# Patient Record
Sex: MALE | Race: WHITE | NOT HISPANIC OR LATINO | Employment: PART TIME | URBAN - METROPOLITAN AREA
[De-identification: names, ages, dates, MRNs, and addresses within clinical notes are randomized per-mention and may not be internally consistent; named-entity substitution may affect disease eponyms.]

---

## 2022-12-23 ENCOUNTER — TRANSFERRED RECORDS (OUTPATIENT)
Dept: HEALTH INFORMATION MANAGEMENT | Facility: CLINIC | Age: 67
End: 2022-12-23

## 2022-12-27 ENCOUNTER — OFFICE VISIT (OUTPATIENT)
Dept: OPHTHALMOLOGY | Facility: CLINIC | Age: 67
End: 2022-12-27
Attending: OPHTHALMOLOGY
Payer: COMMERCIAL

## 2022-12-27 DIAGNOSIS — H04.123 DRY EYES, BILATERAL: ICD-10-CM

## 2022-12-27 DIAGNOSIS — H47.10 OPTIC DISC EDEMA: ICD-10-CM

## 2022-12-27 DIAGNOSIS — H46.9 OPTIC NEUROPATHY: Primary | ICD-10-CM

## 2022-12-27 PROCEDURE — 99207 OCT RETINA SPECTRALIS OU (BOTH EYE): CPT | Mod: 26 | Performed by: OPHTHALMOLOGY

## 2022-12-27 PROCEDURE — 99204 OFFICE O/P NEW MOD 45 MIN: CPT | Mod: GC | Performed by: OPHTHALMOLOGY

## 2022-12-27 PROCEDURE — 92083 EXTENDED VISUAL FIELD XM: CPT | Performed by: STUDENT IN AN ORGANIZED HEALTH CARE EDUCATION/TRAINING PROGRAM

## 2022-12-27 PROCEDURE — G0463 HOSPITAL OUTPT CLINIC VISIT: HCPCS

## 2022-12-27 PROCEDURE — 92083 EXTENDED VISUAL FIELD XM: CPT | Mod: 26 | Performed by: OPHTHALMOLOGY

## 2022-12-27 PROCEDURE — 92250 FUNDUS PHOTOGRAPHY W/I&R: CPT | Mod: 59 | Performed by: STUDENT IN AN ORGANIZED HEALTH CARE EDUCATION/TRAINING PROGRAM

## 2022-12-27 PROCEDURE — 92133 CPTRZD OPH DX IMG PST SGM ON: CPT | Mod: 26 | Performed by: OPHTHALMOLOGY

## 2022-12-27 PROCEDURE — 92133 CPTRZD OPH DX IMG PST SGM ON: CPT | Performed by: STUDENT IN AN ORGANIZED HEALTH CARE EDUCATION/TRAINING PROGRAM

## 2022-12-27 PROCEDURE — 99207 FUNDUS PHOTOS OU (BOTH EYES): CPT | Mod: 26 | Performed by: OPHTHALMOLOGY

## 2022-12-27 PROCEDURE — 92134 CPTRZ OPH DX IMG PST SGM RTA: CPT | Performed by: STUDENT IN AN ORGANIZED HEALTH CARE EDUCATION/TRAINING PROGRAM

## 2022-12-27 RX ORDER — LOVASTATIN 40 MG
40 TABLET ORAL DAILY
COMMUNITY
Start: 2022-11-11

## 2022-12-27 RX ORDER — LOSARTAN POTASSIUM 25 MG/1
0.5 TABLET ORAL DAILY
COMMUNITY
Start: 2022-11-11

## 2022-12-27 RX ORDER — METFORMIN HCL 500 MG
500 TABLET, EXTENDED RELEASE 24 HR ORAL
COMMUNITY
Start: 2022-11-11

## 2022-12-27 RX ORDER — CARBOXYMETHYLCELLULOSE SODIUM 5 MG/ML
1 SOLUTION/ DROPS OPHTHALMIC 4 TIMES DAILY PRN
Status: ACTIVE | OUTPATIENT
Start: 2022-12-27

## 2022-12-27 RX ORDER — TRAZODONE HYDROCHLORIDE 50 MG/1
1 TABLET, FILM COATED ORAL AT BEDTIME
COMMUNITY
Start: 2022-08-18

## 2022-12-27 RX ORDER — TEMAZEPAM 15 MG/1
15 CAPSULE ORAL
COMMUNITY
Start: 2022-11-11

## 2022-12-27 RX ORDER — MOXIFLOXACIN 5 MG/ML
1 SOLUTION/ DROPS OPHTHALMIC 4 TIMES DAILY
Qty: 3 ML | Refills: 0 | Status: SHIPPED | OUTPATIENT
Start: 2022-12-27

## 2022-12-27 ASSESSMENT — SLIT LAMP EXAM - LIDS
COMMENTS: MGD
COMMENTS: MGD

## 2022-12-27 ASSESSMENT — CONF VISUAL FIELD
OS_INFERIOR_NASAL_RESTRICTION: 0
OD_SUPERIOR_TEMPORAL_RESTRICTION: 0
METHOD: COUNTING FINGERS
OD_INFERIOR_NASAL_RESTRICTION: 0
OD_SUPERIOR_NASAL_RESTRICTION: 0
OS_SUPERIOR_NASAL_RESTRICTION: 0
OS_INFERIOR_TEMPORAL_RESTRICTION: 0
OS_NORMAL: 1
OD_NORMAL: 1
OD_INFERIOR_TEMPORAL_RESTRICTION: 0
OS_SUPERIOR_TEMPORAL_RESTRICTION: 0

## 2022-12-27 ASSESSMENT — VISUAL ACUITY
CORRECTION_TYPE: GLASSES
OS_CC+: -1
METHOD: SNELLEN - LINEAR
OD_CC+: -1
OD_CC: 20/20
OS_CC: 20/20

## 2022-12-27 ASSESSMENT — TONOMETRY
OD_IOP_MMHG: 18
IOP_METHOD: TONOPEN
OS_IOP_MMHG: 15

## 2022-12-27 ASSESSMENT — REFRACTION_WEARINGRX
OD_SPHERE: +3.00
OS_AXIS: 034
SPECS_TYPE: PAL
OS_SPHERE: +3.00
OS_CYLINDER: +0.50
OD_ADD: +2.50
OD_CYLINDER: +0.75
OD_AXIS: 161
OS_ADD: +2.50

## 2022-12-27 ASSESSMENT — CUP TO DISC RATIO: OS_RATIO: .2

## 2022-12-27 NOTE — PROGRESS NOTES
Ophthalmology Acute Clinic     Chief Complaint(s) and History of Present Illness(es)    FB feeling right eye off and on X1week  C/o watering, burning and irritation   States floaters both eye right >Left  No redness    Dora Jang COT 7:43 AM December 27, 2022                HPI:   David Wu is a 67 year old male who presents for further evaluation from optometrist. He reports that he had a foreign body sensation in his right eye for about a week. He has noted that on his right inferior peripheral vision everything is blurry. On Friday he saw his optometrist at [facility] in [place]. There he was found to have optic disc edema.      ROS    ENT: Normal  Cardiac: Normal  Derm: Normal  Respiratory: Normal  Muscle/Skel: Normal  Allergic/Immunology: Normal  Neuro: Normal  Psych: Normal  Endocrine: Normal  Heme: Normal  Rheumatologic: Normal  : Normal  GI: Normal  Constitutional: Normal           Past Ocular history:   - Glasses: Yes  - Contact lens wear: None  - Ocular Surgical History: No surgeries but has had metal exposure to cornea previously  - Current Eye drops: None    PMH: HTN, HLD, DM, osteoarthritis    FH: No glaucoma or AMD.     Review of systems for the eyes was negative other than the pertinent positives/negatives listed in the HPI.      Assessment & Plan      David Wu is a 67 year old male with the following diagnoses:   1. Optic neuropathy - Right Eye    2. Optic disc edema - Right Eye    3. Dry eyes, bilateral         NAION right eye   Optic disc edema right eye   Inferior peripheral vision loss right eye     History of HTN, HLD, DM, previous smoker, currently using sildenafil occasionally.  Patient denies headache, scalp tenderness, jaw claudication, fever, fatigue, unintended weight loss, double vision, vision loss, muscle pain in shoulders.  Exam showed no temporal tenderness. Temporal artery is pulsatile without gross palpable thickening.  Patient has grade 3 disc edema with flame  hemorrhage right eye without any involvement of left eye. His visual fields show a right inferior altitudinal defect with normal left vision.  -Discontinue sildenafil or other ED medications, counseled that in the future these would increase risk of recurrence   -Follow up with PCP to optimize HTN, HLD, DM  -Does not smoke, counseled against smoking in the future  -Follow up in 4-6 weeks, return sooner with worsening symptoms    Central punctate abrasion right eye  Dry eye each eye   EBMD  No surrounding stromal haze. Likely   - Vigamox four times a day right eye  - Recommended artificial tears QID both eyes  - Recommend warm compress twice daily  - Follow up in 1 week here or with optometrist  - Return precautions reviewed     Patient disposition:   Return in about 6 weeks (around 2/7/2023) for Follow up VTD.    Patient seen with Dr. Jang    Thank you for entrusting us with your care  Fidelina Tellez MD  Resident Physician, PGY-2  Department of Ophthalmology  12/27/22 8:04 AM    Attending Physician Attestation:  Complete documentation of historical and exam elements from today's encounter can be found in the full encounter summary report (not reduplicated in this progress note).  I personally obtained the chief complaint(s) and history of present illness.  I confirmed and edited as necessary the review of systems, past medical/surgical history, family history, social history, and examination findings as documented by others; and I examined the patient myself.  I personally reviewed the relevant tests, images, and reports as documented above.  I formulated and edited as necessary the assessment and plan and discussed the findings and management plan with the patient and family. Attending Physician Image/Tesing Attestation: I personally reviewed the ophthalmic test(s) associated with this encounter, agree with the interpretation(s) as documented by the resident/fellow, and have edited the corresponding report(s) as  necessary.   . - Silvestre Jang MD

## 2022-12-27 NOTE — PATIENT INSTRUCTIONS
Recommended regimen:  Artificial tears 4x/day - better to use preservative free if using more than 4x/day  Warm compress twice a day at least - either a warm towel or a clean sock filled with rice and then microwaved until it's warm      Artificial Tears 4-5x/day:  Systane  Refresh  Tears Naturale  Genteal  Optive  Soothe  Hypotears    All of these are good products.    DO NOT USE VISINE OR CLEAR EYES.    Warm compresses  Perform 2 times daily, or as ordered by your doctor   1.. Use a commercially available hot pack   2. Dry rice in a clean sock and microwave until hot       - Place warm compress on closed eyelids for about 5 minutes       - Gently massage eye lids for 30 seconds

## 2022-12-27 NOTE — LETTER
12/27/2022        RE: David Wu  [address] [address]        Ophthalmology Acute Clinic     Chief Complaint(s) and History of Present Illness(es)    FB feeling right eye off and on X1week  C/o watering, burning and irritation   States floaters both eye right >Left  No redness    Dora Jang COT 7:43 AM December 27, 2022                HPI:   David Wu is a 67 year old male who presents for further evaluation from optometrist. He reports that he had a foreign body sensation in his right eye for about a week. He has noted that on his right inferior peripheral vision everything is blurry. On Friday he saw his optometrist at [facility] in [place]. There he was found to have optic disc edema.      ROS    ENT: Normal  Cardiac: Normal  Derm: Normal  Respiratory: Normal  Muscle/Skel: Normal  Allergic/Immunology: Normal  Neuro: Normal  Psych: Normal  Endocrine: Normal  Heme: Normal  Rheumatologic: Normal  : Normal  GI: Normal  Constitutional: Normal           Past Ocular history:   - Glasses: Yes  - Contact lens wear: None  - Ocular Surgical History: No surgeries but has had metal exposure to cornea previously  - Current Eye drops: None    PMH: HTN, HLD, DM, osteoarthritis    FH: No glaucoma or AMD.     Review of systems for the eyes was negative other than the pertinent positives/negatives listed in the HPI.      Assessment & Plan     David Wu is a 67 year old male with the following diagnoses:   1. Optic neuropathy - Right Eye    2. Optic disc edema - Right Eye    3. Dry eyes, bilateral         NAION right eye   Optic disc edema right eye   Inferior peripheral vision loss right eye     History of HTN, HLD, DM, previous smoker, currently using sildenafil occasionally.  Patient denies headache, scalp tenderness, jaw claudication, fever, fatigue, unintended weight loss, double vision, vision loss, muscle pain in shoulders.  Exam showed no temporal tenderness. Temporal artery is  pulsatile without gross palpable thickening.  Patient has grade 3 disc edema with flame hemorrhage right eye without any involvement of left eye. His visual fields show a right inferior altitudinal defect with normal left vision.  -Discontinue sildenafil or other ED medications, counseled that in the future these would increase risk of recurrence   -Follow up with PCP to optimize HTN, HLD, DM  -Does not smoke, counseled against smoking in the future  -Follow up in 4-6 weeks, return sooner with worsening symptoms    Central punctate abrasion right eye  Dry eye each eye   EBMD  No surrounding stromal haze. Likely   - Vigamox four times a day right eye  - Recommended artificial tears QID both eyes  - Recommend warm compress twice daily  - Follow up in 1 week here or with optometrist  - Return precautions reviewed     Patient disposition:   Return in about 6 weeks (around 2/7/2023) for Follow up VTD.    Patient seen with Dr. Jang    Thank you for entrusting us with your care  Fidelina Tellez MD  Resident Physician, PGY-2  Department of Ophthalmology  12/27/22 8:04 AM    Attending Physician Attestation:  Complete documentation of historical and exam elements from today's encounter can be found in the full encounter summary report (not reduplicated in this progress note).  I personally obtained the chief complaint(s) and history of present illness.  I confirmed and edited as necessary the review of systems, past medical/surgical history, family history, social history, and examination findings as documented by others; and I examined the patient myself.  I personally reviewed the relevant tests, images, and reports as documented above.  I formulated and edited as necessary the assessment and plan and discussed the findings and management plan with the patient and family. Attending Physician Image/Tesing Attestation: I personally reviewed the ophthalmic test(s) associated with this encounter, agree with the interpretation(s)  as documented by the resident/fellow, and have edited the corresponding report(s) as necessary.   . - Silvestre Jang MD           Sincerely,        Nanette Tellez MD

## 2022-12-27 NOTE — NURSING NOTE
No chief complaint on file.    Chief Complaint(s) and History of Present Illness(es)    FB feeling right eye off and on X1week  C/o watering, burning and irritation   States floaters both eye right >Left  No redness    Dora Jang COT 7:43 AM December 27, 2022

## 2023-10-10 ENCOUNTER — APPOINTMENT (OUTPATIENT)
Dept: MRI IMAGING | Facility: CLINIC | Age: 68
End: 2023-10-10
Attending: EMERGENCY MEDICINE
Payer: COMMERCIAL

## 2023-10-10 ENCOUNTER — HOSPITAL ENCOUNTER (EMERGENCY)
Facility: CLINIC | Age: 68
Discharge: HOME | End: 2023-10-10
Attending: EMERGENCY MEDICINE | Admitting: EMERGENCY MEDICINE
Payer: COMMERCIAL

## 2023-10-10 VITALS
HEIGHT: 72 IN | HEART RATE: 58 BPM | SYSTOLIC BLOOD PRESSURE: 151 MMHG | WEIGHT: 205 LBS | RESPIRATION RATE: 18 BRPM | OXYGEN SATURATION: 94 % | TEMPERATURE: 96.5 F | DIASTOLIC BLOOD PRESSURE: 84 MMHG | BODY MASS INDEX: 27.77 KG/M2

## 2023-10-10 DIAGNOSIS — J01.30 ACUTE NON-RECURRENT SPHENOIDAL SINUSITIS: ICD-10-CM

## 2023-10-10 DIAGNOSIS — R42 VERTIGO: ICD-10-CM

## 2023-10-10 LAB
ALBUMIN SERPL BCG-MCNC: 4.8 G/DL (ref 3.5–5.2)
ALP SERPL-CCNC: 52 U/L (ref 40–129)
ALT SERPL W P-5'-P-CCNC: 20 U/L (ref 0–70)
ANION GAP SERPL CALCULATED.3IONS-SCNC: 15 MMOL/L (ref 7–15)
AST SERPL W P-5'-P-CCNC: 29 U/L (ref 0–45)
BASO+EOS+MONOS # BLD AUTO: NORMAL 10*3/UL
BASO+EOS+MONOS NFR BLD AUTO: NORMAL %
BASOPHILS # BLD AUTO: 0 10E3/UL (ref 0–0.2)
BASOPHILS NFR BLD AUTO: 0 %
BILIRUB SERPL-MCNC: 1.8 MG/DL
BUN SERPL-MCNC: 9.3 MG/DL (ref 8–23)
CALCIUM SERPL-MCNC: 9.8 MG/DL (ref 8.8–10.2)
CHLORIDE SERPL-SCNC: 92 MMOL/L (ref 98–107)
CREAT SERPL-MCNC: 0.81 MG/DL (ref 0.67–1.17)
DEPRECATED HCO3 PLAS-SCNC: 27 MMOL/L (ref 22–29)
EGFRCR SERPLBLD CKD-EPI 2021: >90 ML/MIN/1.73M2
EOSINOPHIL # BLD AUTO: 0 10E3/UL (ref 0–0.7)
EOSINOPHIL NFR BLD AUTO: 0 %
ERYTHROCYTE [DISTWIDTH] IN BLOOD BY AUTOMATED COUNT: 13.9 % (ref 10–15)
GLUCOSE SERPL-MCNC: 147 MG/DL (ref 70–99)
HCT VFR BLD AUTO: 45.6 % (ref 40–53)
HGB BLD-MCNC: 15.6 G/DL (ref 13.3–17.7)
HOLD SPECIMEN: NORMAL
HOLD SPECIMEN: NORMAL
IMM GRANULOCYTES # BLD: 0 10E3/UL
IMM GRANULOCYTES NFR BLD: 0 %
LYMPHOCYTES # BLD AUTO: 1.5 10E3/UL (ref 0.8–5.3)
LYMPHOCYTES NFR BLD AUTO: 16 %
MCH RBC QN AUTO: 29.5 PG (ref 26.5–33)
MCHC RBC AUTO-ENTMCNC: 34.2 G/DL (ref 31.5–36.5)
MCV RBC AUTO: 86 FL (ref 78–100)
MONOCYTES # BLD AUTO: 0.7 10E3/UL (ref 0–1.3)
MONOCYTES NFR BLD AUTO: 8 %
NEUTROPHILS # BLD AUTO: 7.3 10E3/UL (ref 1.6–8.3)
NEUTROPHILS NFR BLD AUTO: 76 %
NRBC # BLD AUTO: 0 10E3/UL
NRBC BLD AUTO-RTO: 0 /100
PLATELET # BLD AUTO: 329 10E3/UL (ref 150–450)
POTASSIUM SERPL-SCNC: 3.4 MMOL/L (ref 3.4–5.3)
PROT SERPL-MCNC: 7.4 G/DL (ref 6.4–8.3)
RBC # BLD AUTO: 5.29 10E6/UL (ref 4.4–5.9)
SODIUM SERPL-SCNC: 134 MMOL/L (ref 135–145)
WBC # BLD AUTO: 9.6 10E3/UL (ref 4–11)

## 2023-10-10 PROCEDURE — 85025 COMPLETE CBC W/AUTO DIFF WBC: CPT | Performed by: EMERGENCY MEDICINE

## 2023-10-10 PROCEDURE — 36415 COLL VENOUS BLD VENIPUNCTURE: CPT | Performed by: EMERGENCY MEDICINE

## 2023-10-10 PROCEDURE — 93005 ELECTROCARDIOGRAM TRACING: CPT | Performed by: EMERGENCY MEDICINE

## 2023-10-10 PROCEDURE — 70549 MR ANGIOGRAPH NECK W/O&W/DYE: CPT

## 2023-10-10 PROCEDURE — 258N000003 HC RX IP 258 OP 636: Performed by: EMERGENCY MEDICINE

## 2023-10-10 PROCEDURE — 96374 THER/PROPH/DIAG INJ IV PUSH: CPT | Mod: 59 | Performed by: EMERGENCY MEDICINE

## 2023-10-10 PROCEDURE — 99284 EMERGENCY DEPT VISIT MOD MDM: CPT | Mod: 25 | Performed by: EMERGENCY MEDICINE

## 2023-10-10 PROCEDURE — A9585 GADOBUTROL INJECTION: HCPCS | Performed by: EMERGENCY MEDICINE

## 2023-10-10 PROCEDURE — 96361 HYDRATE IV INFUSION ADD-ON: CPT | Performed by: EMERGENCY MEDICINE

## 2023-10-10 PROCEDURE — 255N000002 HC RX 255 OP 636: Performed by: EMERGENCY MEDICINE

## 2023-10-10 PROCEDURE — 70544 MR ANGIOGRAPHY HEAD W/O DYE: CPT | Mod: XU

## 2023-10-10 PROCEDURE — 250N000011 HC RX IP 250 OP 636: Performed by: EMERGENCY MEDICINE

## 2023-10-10 PROCEDURE — 93010 ELECTROCARDIOGRAM REPORT: CPT | Performed by: EMERGENCY MEDICINE

## 2023-10-10 PROCEDURE — 70553 MRI BRAIN STEM W/O & W/DYE: CPT

## 2023-10-10 PROCEDURE — 80053 COMPREHEN METABOLIC PANEL: CPT | Performed by: EMERGENCY MEDICINE

## 2023-10-10 PROCEDURE — 99285 EMERGENCY DEPT VISIT HI MDM: CPT | Mod: 25 | Performed by: EMERGENCY MEDICINE

## 2023-10-10 RX ORDER — GADOBUTROL 604.72 MG/ML
10 INJECTION INTRAVENOUS ONCE
Status: COMPLETED | OUTPATIENT
Start: 2023-10-10 | End: 2023-10-10

## 2023-10-10 RX ORDER — LORAZEPAM 2 MG/ML
1 INJECTION INTRAMUSCULAR ONCE
Status: COMPLETED | OUTPATIENT
Start: 2023-10-10 | End: 2023-10-10

## 2023-10-10 RX ORDER — MECLIZINE HCL 12.5 MG 12.5 MG/1
12.5 TABLET ORAL 4 TIMES DAILY PRN
Qty: 30 TABLET | Refills: 0 | Status: SHIPPED | OUTPATIENT
Start: 2023-10-10

## 2023-10-10 RX ADMIN — SODIUM CHLORIDE 50 ML: 9 INJECTION, SOLUTION INTRAVENOUS at 11:23

## 2023-10-10 RX ADMIN — GADOBUTROL 10 ML: 604.72 INJECTION INTRAVENOUS at 11:36

## 2023-10-10 RX ADMIN — SODIUM CHLORIDE 1000 ML: 9 INJECTION, SOLUTION INTRAVENOUS at 10:09

## 2023-10-10 RX ADMIN — LORAZEPAM 1 MG: 2 INJECTION INTRAMUSCULAR; INTRAVENOUS at 10:09

## 2023-10-10 ASSESSMENT — ACTIVITIES OF DAILY LIVING (ADL)
ADLS_ACUITY_SCORE: 35
ADLS_ACUITY_SCORE: 35

## 2023-10-10 NOTE — DISCHARGE INSTRUCTIONS
Follow-up with your primary clinic as needed.    Augmentin as directed.    Meclizine, as directed, if needed for vertigo.    Use Afrin nasal spray, 2 sprays in each nostril twice per day for no longer than 3 days in a row to help with sinus and ear congestion.    Return to the emergency department for any problems.

## 2023-10-10 NOTE — ED TRIAGE NOTES
Pt c/o persistant vertigo x 3 days, states has had this before can't get rid of it this time. Pt has cont nausea with dry heaves. Denies weakness, numbness or tingling.      Triage Assessment       Row Name 10/10/23 0944       Triage Assessment (Adult)    Airway WDL WDL       Respiratory WDL    Respiratory WDL WDL       Skin Circulation/Temperature WDL    Skin Circulation/Temperature WDL WDL       Cardiac WDL    Cardiac WDL WDL       Peripheral/Neurovascular WDL    Peripheral Neurovascular WDL WDL       Cognitive/Neuro/Behavioral WDL    Cognitive/Neuro/Behavioral WDL WDL

## 2023-10-10 NOTE — ED NOTES
Pt reports his right ear feeling plugged 2-5 days prior to vertigo episode. Pt said this is the ear that usually triggers his vertigo.

## 2023-10-10 NOTE — ED PROVIDER NOTES
History     Chief Complaint   Patient presents with    Vertigo     HPI  David Wu is a 68 year old male who presents to the emergency department reporting 3 days of vertigo with nausea and vomiting.  This has been constant and does not seem to be worsened by anything in particular.  Patient does report that he has had similar presentation in the past.  He reports that his right ear feels clogged and he is having some tinnitus.  He has been unable to walk today secondary to the severity of his symptoms.  No recent head injury.  He denies headache, change in vision, double vision, numbness or weakness of extremities or difficulty swallowing or speaking.    Allergies:  Allergies   Allergen Reactions    Lisinopril Cough       Problem List:    Patient Active Problem List    Diagnosis Date Noted    Hypertension goal BP (blood pressure) < 140/90 12/11/2014     Priority: Medium    Arthrosis of right acromioclavicular joint 10/21/2014     Priority: Medium    Biceps tendonitis 10/21/2014     Priority: Medium    Rotator cuff tendinitis 10/21/2014     Priority: Medium    CARDIOVASCULAR SCREENING; LDL GOAL LESS THAN 130 05/27/2014     Priority: Medium    MRSA infection 05/18/2014     Priority: Medium     5/2014.  Brock finger.      Impingement syndrome, shoulder 04/22/2014     Priority: Medium    Paresthesias/numbness 04/22/2014     Priority: Medium     IMO Regulatory Load OCT 2020      Foreign body of right ear 01/25/2012     Priority: Medium    Tinnitus 01/25/2012     Priority: Medium    Lateral epicondylitis 11/18/2010     Priority: Medium     Problem list name updated by automated process. Provider to review      Pain in joint, upper arm 11/18/2010     Priority: Medium    Synovitis and tenosynovitis 11/18/2010     Priority: Medium     Problem list name updated by automated process. Provider to review      Epicondylitis, lateral humeral 11/17/2010     Priority: Medium    Contusion shoulder/arm 07/20/2010     Priority:  Medium        Past Medical History:    Past Medical History:   Diagnosis Date    Hypertension        Past Surgical History:    Past Surgical History:   Procedure Laterality Date    ARTHROSCOPY SHOULDER, OPEN DISTAL CLAVICLE REPAIR, COMBINED Right 2014    Procedure: COMBINED ARTHROSCOPY SHOULDER, OPEN DISTAL CLAVICLE RESECTION;  Surgeon: Cristóbal Rose MD;  Location: [facility] OR    ARTHROSCOPY SHOULDER, OPEN ROTATOR CUFF REPAIR, COMBINED Right 2014    Procedure: COMBINED ARTHROSCOPY SHOULDER, OPEN ROTATOR CUFF REPAIR;  Surgeon: Cristóbal Rose MD;  Location: [facility] OR    NO HISTORY OF SURGERY         Family History:    Family History   Problem Relation Age of Onset    Arthritis Mother     Arthritis Father     Diabetes No family hx of     Glaucoma No family hx of     Macular Degeneration No family hx of        Social History:  Marital Status:   [2]  Social History     Tobacco Use    Smoking status: Former     Types: Cigarettes     Quit date: 1989     Years since quittin.5    Smokeless tobacco: Never   Substance Use Topics    Alcohol use: Yes     Comment: 8-10 beers weekly    Drug use: No        Medications:    amoxicillin-clavulanate (AUGMENTIN) 875-125 MG tablet  meclizine (ANTIVERT) 12.5 MG tablet  ASPIRIN 325 MG PO TABS  Atorvastatin Calcium (LIPITOR PO)  Cholecalciferol (VITAMIN D) 2000 UNIT CAPS  HYDROCHLOROTHIAZIDE 50 MG OR TABS  hydrOXYzine (VISTARIL) 25 MG capsule  losartan (COZAAR) 25 MG tablet  lovastatin (MEVACOR) 40 MG tablet  metFORMIN (GLUCOPHAGE XR) 500 MG 24 hr tablet  moxifloxacin (VIGAMOX) 0.5 % ophthalmic solution  Omega-3 Fatty Acids (FISH OIL) 1000 MG CPDR  oxyCODONE (ROXICODONE) 5 MG immediate release tablet  temazepam (RESTORIL) 15 MG capsule  traZODone (DESYREL) 50 MG tablet          Review of Systems    Physical Exam   BP: (!) 154/80  Pulse: 67  Temp: (!) 96.5  F (35.8  C)  Resp: 18  Height: 182.9 cm (6')  Weight: 93 kg (205 lb)  SpO2: 99 %      Physical  Exam  Vitals and nursing note reviewed.   Constitutional:       General: He is not in acute distress.     Appearance: He is well-developed. He is not ill-appearing, toxic-appearing or diaphoretic.   HENT:      Head: Normocephalic and atraumatic.      Mouth/Throat:      Lips: Pink.      Mouth: Mucous membranes are moist.      Pharynx: Oropharynx is clear. No oropharyngeal exudate.   Eyes:      General: Lids are normal. No visual field deficit or scleral icterus.     Extraocular Movements: Extraocular movements intact.      Right eye: Nystagmus present.      Left eye: Nystagmus present.      Conjunctiva/sclera: Conjunctivae normal.      Pupils: Pupils are equal, round, and reactive to light.   Neck:      Thyroid: No thyromegaly.      Vascular: No JVD.      Trachea: No tracheal deviation.   Cardiovascular:      Rate and Rhythm: Normal rate and regular rhythm.      Pulses: Normal pulses.      Heart sounds: Normal heart sounds. No murmur heard.     No friction rub. No gallop.   Pulmonary:      Effort: Pulmonary effort is normal. No respiratory distress.      Breath sounds: Normal breath sounds.   Abdominal:      General: Bowel sounds are normal. There is no distension.      Palpations: Abdomen is soft. There is no mass.      Tenderness: There is no abdominal tenderness. There is no guarding or rebound.   Musculoskeletal:         General: No tenderness. Normal range of motion.      Cervical back: Normal range of motion and neck supple. No erythema or rigidity.      Right lower leg: No edema.      Left lower leg: No edema.   Lymphadenopathy:      Cervical: No cervical adenopathy.   Skin:     General: Skin is warm and dry.      Capillary Refill: Capillary refill takes less than 2 seconds.      Coloration: Skin is not pale.      Findings: No erythema or rash.   Neurological:      Mental Status: He is alert and oriented to person, place, and time.      Cranial Nerves: No cranial nerve deficit, dysarthria or facial asymmetry.       Sensory: No sensory deficit.      Motor: Motor function is intact.      Coordination: Finger-Nose-Finger Test and Heel to Shin Test normal.      Gait: Gait abnormal.      Comments: No aphasia noted.  Patient does have some rotatory nystagmus.  He is unable to stand up or ambulate as he is off balance and begins falling when attempting to do so.   Psychiatric:         Mood and Affect: Mood and affect normal.         Speech: Speech normal.         Behavior: Behavior normal.         ED Course                 Procedures              EKG Interpretation:      Interpreted by Jimmy Waite MD    Symptoms at time of EKG: Vertigo  Rhythm: sinus bradycardia  Rate: 57  Ectopy: none  Conduction: normal  ST Segments/ T Waves: No acute ischemic changes  Q Waves: none  Comparison to prior: Unchanged    Clinical Impression: no acute changes and sinus bradycardia                   Results for orders placed or performed during the hospital encounter of 10/10/23 (from the past 24 hour(s))   [place] Draw    Narrative    The following orders were created for panel order [place] Draw.  Procedure                               Abnormality         Status                     ---------                               -----------         ------                     Extra Blue Top Tube[[id number]]                              Final result               Extra Red Top Tube[[id number]]                               Final result               Extra Green Top (Lithium...[[id number]]                                                 Extra Purple Top Tube[[id number]]                                                         Please view results for these tests on the individual orders.   Extra Blue Top Tube   Result Value Ref Range    Hold Specimen JIC    Extra Red Top Tube   Result Value Ref Range    Hold Specimen JIC    Comprehensive metabolic panel   Result Value Ref Range    Sodium 134 (L) 135 - 145 mmol/L    Potassium 3.4 3.4 - 5.3 mmol/L     Carbon Dioxide (CO2) 27 22 - 29 mmol/L    Anion Gap 15 7 - 15 mmol/L    Urea Nitrogen 9.3 8.0 - 23.0 mg/dL    Creatinine 0.81 0.67 - 1.17 mg/dL    GFR Estimate >90 >60 mL/min/1.73m2    Calcium 9.8 8.8 - 10.2 mg/dL    Chloride 92 (L) 98 - 107 mmol/L    Glucose 147 (H) 70 - 99 mg/dL    Alkaline Phosphatase 52 40 - 129 U/L    AST 29 0 - 45 U/L    ALT 20 0 - 70 U/L    Protein Total 7.4 6.4 - 8.3 g/dL    Albumin 4.8 3.5 - 5.2 g/dL    Bilirubin Total 1.8 (H) <=1.2 mg/dL   CBC with platelets differential    Narrative    The following orders were created for panel order CBC with platelets differential.  Procedure                               Abnormality         Status                     ---------                               -----------         ------                     CBC with platelets and d...[[id number]]                      Final result                 Please view results for these tests on the individual orders.   CBC with platelets and differential   Result Value Ref Range    WBC Count 9.6 4.0 - 11.0 10e3/uL    RBC Count 5.29 4.40 - 5.90 10e6/uL    Hemoglobin 15.6 13.3 - 17.7 g/dL    Hematocrit 45.6 40.0 - 53.0 %    MCV 86 78 - 100 fL    MCH 29.5 26.5 - 33.0 pg    MCHC 34.2 31.5 - 36.5 g/dL    RDW 13.9 10.0 - 15.0 %    Platelet Count 329 150 - 450 10e3/uL    % Neutrophils 76 %    % Lymphocytes 16 %    % Monocytes 8 %    Mids % (Monos, Eos, Basos)      % Eosinophils 0 %    % Basophils 0 %    % Immature Granulocytes 0 %    NRBCs per 100 WBC 0 <1 /100    Absolute Neutrophils 7.3 1.6 - 8.3 10e3/uL    Absolute Lymphocytes 1.5 0.8 - 5.3 10e3/uL    Absolute Monocytes 0.7 0.0 - 1.3 10e3/uL    Mids Abs (Monos, Eos, Basos)      Absolute Eosinophils 0.0 0.0 - 0.7 10e3/uL    Absolute Basophils 0.0 0.0 - 0.2 10e3/uL    Absolute Immature Granulocytes 0.0 <=0.4 10e3/uL    Absolute NRBCs 0.0 10e3/uL   MR Brain w/o & w Contrast    Narrative    MRI BRAIN WITHOUT AND WITH CONTRAST  10/10/2023 12:01 PM     HISTORY: 3 days of  constant vertigo.     TECHNIQUE: Multiplanar, multisequence MRI of the brain without and  with 10 mL GADAVIST.     COMPARISON: None.     FINDINGS: Questionable punctate focus of diffusion restriction in the  left parietal periventricular white matter (series 4, image 58). No  definite signal correlate on T2 or FLAIR imaging. A few additional  foci of minimally increased signal on diffusion restriction without  definite corresponding loss of signal on ADC are favored to reflect  noise versus T2 shine through. No territorial infarct. There is no  evidence of hemorrhage, mass, or herniation. Mild diffuse parenchymal  volume loss. Mild patchy deep and subcortical white matter T2  hyperintensities which are nonspecific, but likely related to chronic  microvascular ischemic disease. Ventricular size within normal limits  without hydrocephalus.     There is no abnormal intracranial enhancement.     Complete opacification of the left sphenoid sinus. The major arterial  T2 flow voids at the base of the brain appear patent.       Impression    IMPRESSION:  1. Questionable punctate focus of diffusion restriction in the left  parietal periventricular white matter, which is favored to be  artifactual, although a tiny acute infarct is possible.  2. Background age-related chronic microvascular ischemia.      SANAM JORDAN MD         SYSTEM ID:  [id number]   MRA Angiogram Head w/o Contrast    Narrative    MR ANGIOGRAM OF THE HEAD WITHOUT CONTRAST   10/10/2023 12:01 PM     HISTORY: 3 days of constant vertigo    TECHNIQUE:  3D time-of-flight MR angiogram of the head without  contrast.    COMPARISON: None.    FINDINGS: The major intracranial arteries including the proximal  branches of the anterior cerebral, middle cerebral, and posterior  cerebral arteries appear patent without vascular cutoff. No aneurysm  identified. No significant stenosis. Normal variant fetal origin of  the right posterior cerebral artery.       Impression     IMPRESSION:  Normal MRA of the [place] without large vessel  occlusion.        SANAM JORDAN MD         SYSTEM ID:  [id number]   MRA Angiogram Neck w/o & w Contrast    Narrative    MRA NECK WITHOUT AND WITH CONTRAST  10/10/2023 12:01 PM     HISTORY: 3 days of constant vertigo     TECHNIQUE: 2D time-of-flight MR angiogram of the neck without contrast  and 3D MR angiogram of the neck with  10 ML GADAVIST. Estimates of  carotid stenoses are made relative to the distal internal carotid  artery diameters except as noted.     COMPARISON: None.     FINDINGS:    Normal origin of the great vessels from the aortic arch.     Right carotid artery: The right common and internal carotid arteries  are patent. No significant stenosis.     Left carotid artery: The left common and internal carotid arteries are  patent. No significant stenosis.     Vertebral arteries: Vertebral arteries appear patent without evidence  of dissection. No significant stenosis.       Impression    IMPRESSION:  No stenosis/occlusion or dissection.       SANAM JORDAN MD         SYSTEM ID:  [id number]       Medications   sodium chloride 0.9% BOLUS 1,000 mL (0 mLs Intravenous Stopped 10/10/23 1346)   LORazepam (ATIVAN) injection 1 mg (1 mg Intravenous $Given 10/10/23 1009)   gadobutrol (GADAVIST) injection 10 mL (10 mLs Intravenous $Given 10/10/23 1136)   sodium chloride 0.9% BOLUS 50 mL (0 mLs Intravenous Stopped 10/10/23 1416)       Assessments & Plan (with Medical Decision Making)     I have reviewed the nursing notes.    I have reviewed the findings, diagnosis, plan and need for follow up with the patient.  This patient presented emergency department complaining of severe vertigo associated with nausea and vomiting and inability to ambulate because he felt off balance.  He does have rotatory nystagmus on exam.  Given his age and severity of symptoms I did obtain an MRI/MRA.  This demonstrated no evidence of posterior circulation stroke or  cervical vessel disease.  Opacification of the left sphenoid sinus is noted.  Patient felt much improved after dose of IV Ativan and IV fluid.  He was able to tolerate oral intake and was able to ambulate independently.  At this point time, he is comfortable going home.  I will treat for sinusitis with Augmentin and recommended Afrin nasal spray as well as meclizine if needed for symptoms.  He was told to return to the emergency department for worsening or return of symptoms as he may need hospitalization for symptom control and IV hydration.  He was discharged with his family in good condition.        Discharge Medication List as of 10/10/2023  2:17 PM        START taking these medications    Details   amoxicillin-clavulanate (AUGMENTIN) 875-125 MG tablet Take 1 tablet by mouth 2 times daily for 10 days, Disp-20 tablet, R-0, E-Prescribe      meclizine (ANTIVERT) 12.5 MG tablet Take 1 tablet (12.5 mg) by mouth 4 times daily as needed for dizziness, Disp-30 tablet, R-0, E-Prescribe             Final diagnoses:   Vertigo   Acute non-recurrent sphenoidal sinusitis       10/10/2023   [facility] EMERGENCY DEPT       Jimmy Waite MD  10/10/23 7578

## 2023-11-14 ENCOUNTER — HOSPITAL ENCOUNTER (EMERGENCY)
Facility: CLINIC | Age: 68
Discharge: HOME | End: 2023-11-14
Attending: FAMILY MEDICINE | Admitting: FAMILY MEDICINE
Payer: COMMERCIAL

## 2023-11-14 VITALS
BODY MASS INDEX: 27.09 KG/M2 | SYSTOLIC BLOOD PRESSURE: 126 MMHG | TEMPERATURE: 97.5 F | HEART RATE: 66 BPM | HEIGHT: 72 IN | OXYGEN SATURATION: 98 % | WEIGHT: 200 LBS | DIASTOLIC BLOOD PRESSURE: 82 MMHG

## 2023-11-14 DIAGNOSIS — H81.01 MENIERE'S DISEASE OF RIGHT EAR: ICD-10-CM

## 2023-11-14 PROCEDURE — 99284 EMERGENCY DEPT VISIT MOD MDM: CPT | Performed by: FAMILY MEDICINE

## 2023-11-14 PROCEDURE — 99283 EMERGENCY DEPT VISIT LOW MDM: CPT | Performed by: FAMILY MEDICINE

## 2023-11-14 RX ORDER — ONDANSETRON 4 MG/1
4-8 TABLET, ORALLY DISINTEGRATING ORAL EVERY 8 HOURS PRN
Qty: 12 TABLET | Refills: 0 | Status: SHIPPED | OUTPATIENT
Start: 2023-11-14

## 2023-11-14 RX ORDER — LORAZEPAM 1 MG/1
1 TABLET ORAL 2 TIMES DAILY PRN
Qty: 15 TABLET | Refills: 0 | Status: SHIPPED | OUTPATIENT
Start: 2023-11-14

## 2023-11-14 ASSESSMENT — ACTIVITIES OF DAILY LIVING (ADL)
ADLS_ACUITY_SCORE: 33
ADLS_ACUITY_SCORE: 35

## 2023-11-14 NOTE — ED TRIAGE NOTES
Pt c/o dizziness/vertigo for past 6 weeks but worse today.  No headache.  A&O x3.  Neurologically intact otherwise.  Here 6 weeks ago for same.       Triage Assessment (Adult)       Row Name 11/14/23 1507          Triage Assessment    Airway WDL WDL        Respiratory WDL    Respiratory WDL WDL        Skin Circulation/Temperature WDL    Skin Circulation/Temperature WDL WDL        Cardiac WDL    Cardiac WDL WDL        Peripheral/Neurovascular WDL    Peripheral Neurovascular WDL WDL        Cognitive/Neuro/Behavioral WDL    Cognitive/Neuro/Behavioral WDL WDL

## 2023-11-15 NOTE — DISCHARGE INSTRUCTIONS
RETURN TO THE EMERGENCY ROOM FOR THE FOLLOWING:    Severely worsened dizziness, repeated vomiting and dehydration, or at anytime for any concern.    FOLLOW UP:    ENT follow-up recommended, as discussed.    TREATMENT RECOMMENDATIONS:    Lorazepam as needed for dizziness, nausea, anxiety.  Zofran as needed for nausea.    NURSE ADVICE LINE:  [phone] or [phone]

## 2023-11-15 NOTE — ED PROVIDER NOTES
"  HPI   The patient is a 68-year-old male presenting with dizziness.  He has a known history of Ménière's disease.  This was diagnosed about 25 years ago.  He has not had follow-up with ENT for about 5 years.  He has not had any procedures performed on the ears.  He does not take medication on a regular basis for Ménière's disease.    The patient presents with recurrent/persistent dizziness, tinnitus, and hearing loss involving the right ear.  He had another episode start and it is moderate to severe.  Movement will worsen his symptoms but he does describe dizziness that to some extent is constant.  He will have associated nausea.  No new headache or neck pain.  No trauma or injury.  No vision changes, facial droop, one-sided weakness or incoordination.  He admits to being extremely frustrated and angry with his situation.  He said, \"I think I frightened my family because I sort of lost it.\"  He is not currently suicidal or homicidal.      Allergies:  Allergies   Allergen Reactions    Lisinopril Cough     Problem List:    Patient Active Problem List    Diagnosis Date Noted    Hypertension goal BP (blood pressure) < 140/90 12/11/2014     Priority: Medium    Arthrosis of right acromioclavicular joint 10/21/2014     Priority: Medium    Biceps tendonitis 10/21/2014     Priority: Medium    Rotator cuff tendinitis 10/21/2014     Priority: Medium    CARDIOVASCULAR SCREENING; LDL GOAL LESS THAN 130 05/27/2014     Priority: Medium    MRSA infection 05/18/2014     Priority: Medium     5/2014.  Brock finger.      Impingement syndrome, shoulder 04/22/2014     Priority: Medium    Paresthesias/numbness 04/22/2014     Priority: Medium     IMO Regulatory Load OCT 2020      Foreign body of right ear 01/25/2012     Priority: Medium    Tinnitus 01/25/2012     Priority: Medium    Lateral epicondylitis 11/18/2010     Priority: Medium     Problem list name updated by automated process. Provider to review      Pain in joint, upper arm " 2010     Priority: Medium    Synovitis and tenosynovitis 2010     Priority: Medium     Problem list name updated by automated process. Provider to review      Epicondylitis, lateral humeral 2010     Priority: Medium    Contusion shoulder/arm 2010     Priority: Medium      Past Medical History:    Past Medical History:   Diagnosis Date    Hypertension      Past Surgical History:    Past Surgical History:   Procedure Laterality Date    ARTHROSCOPY SHOULDER, OPEN DISTAL CLAVICLE REPAIR, COMBINED Right 2014    Procedure: COMBINED ARTHROSCOPY SHOULDER, OPEN DISTAL CLAVICLE RESECTION;  Surgeon: Cristóbal Rose MD;  Location: [facility] OR    ARTHROSCOPY SHOULDER, OPEN ROTATOR CUFF REPAIR, COMBINED Right 2014    Procedure: COMBINED ARTHROSCOPY SHOULDER, OPEN ROTATOR CUFF REPAIR;  Surgeon: Cristóbal Rose MD;  Location: [facility] OR    NO HISTORY OF SURGERY       Family History:    Family History   Problem Relation Age of Onset    Arthritis Mother     Arthritis Father     Diabetes No family hx of     Glaucoma No family hx of     Macular Degeneration No family hx of      Social History:  Marital Status:   [2]  Social History     Tobacco Use    Smoking status: Former     Types: Cigarettes     Quit date: 1989     Years since quittin.6    Smokeless tobacco: Never   Substance Use Topics    Alcohol use: Yes     Comment: 8-10 beers weekly    Drug use: No      Medications:    ondansetron (ZOFRAN ODT) 4 MG ODT tab  ASPIRIN 325 MG PO TABS  Atorvastatin Calcium (LIPITOR PO)  Cholecalciferol (VITAMIN D) 2000 UNIT CAPS  HYDROCHLOROTHIAZIDE 50 MG OR TABS  hydrOXYzine (VISTARIL) 25 MG capsule  LORazepam (ATIVAN) 1 MG tablet  losartan (COZAAR) 25 MG tablet  lovastatin (MEVACOR) 40 MG tablet  meclizine (ANTIVERT) 12.5 MG tablet  metFORMIN (GLUCOPHAGE XR) 500 MG 24 hr tablet  moxifloxacin (VIGAMOX) 0.5 % ophthalmic solution  Omega-3 Fatty Acids (FISH OIL) 1000 MG CPDR  oxyCODONE (ROXICODONE) 5  MG immediate release tablet  temazepam (RESTORIL) 15 MG capsule  traZODone (DESYREL) 50 MG tablet      Review of Systems   All other systems reviewed and are negative.      PE   BP: 133/73  Pulse: 62  Temp: 97.5  F (36.4  C)  Height: 182.9 cm (6')  Weight: 90.7 kg (200 lb)  SpO2: 100 %  Physical Exam  Vitals and nursing note reviewed.   Constitutional:       Comments: Obviously frustrated with the situation.  Cooperative though and answering questions well.  He is able to sit up at the side of the bed and get dressed on his own.   HENT:      Head: Atraumatic.      Right Ear: External ear normal.      Left Ear: External ear normal.      Nose: Nose normal.      Mouth/Throat:      Mouth: Mucous membranes are moist.      Pharynx: Oropharynx is clear.   Eyes:      General: No scleral icterus.     Extraocular Movements: Extraocular movements intact.      Conjunctiva/sclera: Conjunctivae normal.      Pupils: Pupils are equal, round, and reactive to light.   Cardiovascular:      Rate and Rhythm: Normal rate.   Pulmonary:      Effort: Pulmonary effort is normal. No respiratory distress.   Musculoskeletal:         General: Normal range of motion.      Cervical back: Normal range of motion.   Skin:     General: Skin is warm and dry.   Neurological:      Mental Status: He is alert and oriented to person, place, and time.   Psychiatric:         Behavior: Behavior normal.         ED COURSE and [facility]   1820.  Patient has symptoms and signs as described above.  The patient needs to follow-up with ENT.  There are a number of treatment options which can be tried and I tried to help him understand this.  I provided a long description of what is available according to the up-to-date database.  He had an ENT referral order placed by his primary physician this morning.  He is calling local ENT clinics.  I encouraged him to call ahead to an ENT clinic in [place] where he is heading in December.  Ativan prescribed.  Zofran  prescribed.    Electronic medical chart reviewed, including medical problems, medications, medical allergies, social history.  Recent hospitalizations and surgical procedures reviewed.  Recent clinic visits and consultations reviewed.  Recent labs and test results reviewed.  Nursing notes reviewed.    The patient, their parent if applicable, and/or their medical decision maker(s) and I have reviewed all of the available historical information, applicable PMH, physical exam findings, and objective diagnostic data gathered during this ED visit.  We then discussed all work-up options and then together agreed upon the course taken during this visit.  The ultimate disposition and plan was a cooperative decision made between myself and the patient, their parent if applicable, and/or their legal decision maker(s).  The risks and benefits of all decisions made during this visit were discussed to the best of my abilities given the circumstances, and all parties are understanding of the pertinent ramifications of these decisions.      LABS  Labs Ordered and Resulted from Time of ED Arrival to Time of ED Departure - No data to display    IMAGING  Images reviewed by me.  Radiology report also reviewed.  No orders to display       Procedures    Medications - No data to display      IMPRESSION       ICD-10-CM    1. Meniere's disease of right ear  H81.01                Medication List        Started      LORazepam 1 MG tablet  Commonly known as: ATIVAN  1 mg, Oral, 2 TIMES DAILY PRN     ondansetron 4 MG ODT tab  Commonly known as: ZOFRAN ODT  4-8 mg, Oral, EVERY 8 HOURS PRN                          Hans Perez MD  11/14/23 1827

## 2023-12-12 ENCOUNTER — TRANSFERRED RECORDS (OUTPATIENT)
Dept: HEALTH INFORMATION MANAGEMENT | Facility: CLINIC | Age: 68
End: 2023-12-12
Payer: COMMERCIAL

## 2023-12-18 ENCOUNTER — TELEPHONE (OUTPATIENT)
Dept: OTOLARYNGOLOGY | Facility: CLINIC | Age: 68
End: 2023-12-18
Payer: COMMERCIAL

## 2023-12-18 NOTE — TELEPHONE ENCOUNTER
[facility] Call Center    Phone Message    May a detailed message be left on voicemail: yes     Reason for Call: Other: The pt called about his referral for Dizziness-DX with Meniere's and vertigo on R side: constant, noisy, cannot concentrate. The referral is in the office visit notes dated 12.12.23 from Dr. Jones. The pt is requesting Dr YOLIS Middleton, so he wants to be seen in the [facility]. The pt leaves for [place] right after Christmas so would like to schedule appointments that start in the last week of April 2021. He can be reached this week to discuss. Thanks.      Action Taken: Message routed to:  [facility] ent    Travel Screening: Not Applicable

## 2023-12-20 ENCOUNTER — TELEPHONE (OUTPATIENT)
Dept: OTOLARYNGOLOGY | Facility: CLINIC | Age: 68
End: 2023-12-20
Payer: COMMERCIAL

## 2023-12-20 NOTE — TELEPHONE ENCOUNTER
1. Have you noticed any changes in hearing? Yes  2. Do you have ringing, buzzing, or other sounds in your ears or head, this is also referred to as Tinnitus? Yes  3. When and where was your last [facility] test[place] [facility] [place]  4. Do you feel lightheaded or foggy? Yes  5. Do you have a spinning sensation? Yes  6. Is there any specific position that can bring on dizziness? Random/looking up   7. Does looking up cause dizziness? Yes  8. Does getting in and our of bed cause dizziness? Sometimes: little bit   9. Does turning over in bed increase or cause dizziness? No  10. Does bending over cause dizziness? No  11. Is there anything that you can do to prevent the dizziness? Low sodium  12. Has the dizziness gotten better with time? No  13. Have you seen Physical Therapy for dizziness? (Please indicate clinic and as much of the location as possible): Yes, If yes, where? [facility] if yes, who?   14. Are you being referred to a specific physician? Yes: [place]  15. Have you been evaluated/treated for your dizziness at any other location?  (If yes,obtian as much clinic/provider/locaiton as possible) Yes. (If yes answer the following questions:)   Have you seen any ENT, Neurology, or other providers for these symptoms?             Yes, If yes, where? Teena Jones MD   if yes, who?    Have you had any balance or Audiology testing? No Have you had an MRI or CT scan of your head or neck? Yes, If yes, where? [facility] if yes, who?     Would you like to receive your Release of Information by mail or e-mail?  e-mail

## 2023-12-22 NOTE — TELEPHONE ENCOUNTER
FUTURE VISIT INFORMATION      FUTURE VISIT INFORMATION:  Date: 5/10/24  Time: 11:30AM  Location: [facility]  REFERRAL INFORMATION:  Referring provider:  Teena Jones MD   Referring providers clinic:  [facility]  Reason for visit/diagnosis  Vertigo- Referred by Teena Jones MD     RECORDS REQUESTED FROM:       Clinic name Comments Records Status Imaging Status   [facility] 11/14/23- ED   10/10/23- ED   [facility] [place]   Tel [phone]  Fax  [phone] HEARING TEST    Received and send to scanning.  11/27/23 audiogram 12/22/23- pending req  Emailed KANE 12/27/23     [facility]  12/12/23- OV Teena Jones MD  12/12/23- OV Ashley Merrill AuD    10/11/19- ov / audiogram Bianca Virk PA  & Bridget Santiago AuD   CARE EVERYWHERE     IMAGING  10/10/23- MRA NECK   10/10/23- MRA BRAIN   10/10/23- MR BRAIN  [facility]  PACS            December 22, 2023 3:50 PM - Faxed a request to [employer] for recent audiogram- [place]   December 27, 2023 8:03 AM - emailed patient KANE for recs at [facility] -[place]  January 4, 2024 9:23 AM - Spoke to the patient follow up on [facility] hearing test/ KANE. Patient think he might have a copy but is currently in [place] right now. He will tried to email or send the copy via USPS mail. Plan: Will follow up in a few weeks as requested -Kitty  January 5, 2024 11:34 AM - Received hearing test and send to scanning. Called the patient to let him know that I received the hearing test. -Kitty

## 2024-01-31 DIAGNOSIS — R42 DIZZINESS: Primary | ICD-10-CM

## 2024-01-31 NOTE — TELEPHONE ENCOUNTER
Requesting orders for hearing test, VNG, rotary chair and ECOG testing prior to pt's appt with Dr. Middleton on 5/10/2024.     Per Record review: Previous dx of Right Meniere's disease by outside clinic 25 years ago. Reports decreased hearing in right ear, tinnitus and ear pressure. Vertigo episodes started 4 yrs ago. Two episodes in Nov/Dec prompted ENT follow up with [facility]. Adheres to low salt diet. Pt reportedly interested in labyrinthectomy, requested to be referred here to see Dr. Middleton. Referred to vestibular physical therapy in [facility] system.     Previous audio 12/12/23 ([facility]): RIGHT: essentially moderatley-severe (250-8000 Hz) sensorineural hearing loss LEFT: slight/normal (250-3000 Hz) sloping to mild/slight (0399-3179 Hz) rising to normal (8000 Hz) sensorineural hearing loss. RE 10/11/19: Left ear has decreased 5-10 dB across 250-4000 Hz; right ear has decreased 35-50 dB across 250-1000 Hz and 5-15 dB across 2-8 kHz    Patient reportedly in [place] for winter with return to [place] in April.     Feng Grider. CCC-A  Vestibular Audiologist   [place] #[id number]

## 2024-05-03 ENCOUNTER — TELEPHONE (OUTPATIENT)
Dept: AUDIOLOGY | Facility: CLINIC | Age: 69
End: 2024-05-03
Payer: COMMERCIAL

## 2024-05-03 NOTE — TELEPHONE ENCOUNTER
"\"I m calling from the Audiology and Balance Testing department at the . This is just a call to remind you of your upcoming Balance Testing appointment on [Date], and to see if you have any questions or concerns regarding the balance testing you'll be doing. You should have received an itinerary via mail or via [employer], if you are active, that goes over what to expect and explains the dos and don ts both 48 hours before, and the day of. There is a list of medications for you to review on the itinerary that we would like you to stop taking beforehand. If you didn t receive the itinerary or you still have questions, please give our clinic a call at [phone]. Otherwise, we will see you on [Date] starting at [Time].\"    Please send encounter if patient would like to reschedule.  "

## 2024-05-07 NOTE — PROGRESS NOTES
AUDIOLOGY REPORT-BALANCE ASSESSMENT    SUBJECTIVE: David Wu, 69 year old, was seen in Audiology at the [facility] on 5/8/2024, for videonystagmography (VNG) and rotational chair testing, referred by Kelsi Middleton M.D. Patient has a diagnosis of right Ménière's disease from an outside facility.  Hearing evaluation completed today shows normal sloping to mild sensorineural hearing loss in the left ear, and moderately severe to severe sensorineural hearing loss in the right ear with asymmetrical word recognition.  See ECochG notes with Bertha Alexander for full case history.      David has not taken any antivestibular medications in the past 48 hours.    OBJECTIVE:  Dizziness Handicap Inventory (DHI): 78/100; Severe perceived impairment    Rotational chair testing:   Sinusoidal harmonic acceleration test:  Spontaneous nystagmus: Absent  Phase: Abnormal phase lead at 0.01 Hz, falling into the normal range at 0.02, 0.08, and 0.32 Hz  Gain: Borderline abnormal at 0.01 Hz, with normal gain at 0.02, 0.08, and 0.32 Hz  Symmetry: Normal at 0.01, 0.02, 0.08, and 0.32 Hz  Spectral Purity: Normal at 0.01, 0.02, 0.08, and 0.32 Hz  Overall rotational chair test: Abnormal phase lead and borderline abnormal gain at 0.01 Hz, with normal gain, phase, and symmetry at 0.02, 0.08, and 0.32 Hz    Videonystagmography (VNG) testing:  Prescreening:  Tympanograms: Tested during hearing evaluation earlier today showed normal eardrum mobility bilaterally. Note: this test is completed to determine the status of the middle ear before irrigations are completed.  Ocular range of motion and ocular counter roll: Normal  Cross/cover:Normal  Head Thrust: Negative     Nystagmus Tests:  Gaze-Horizontal with fixation:   Center: Normal   Right: Normal   Left: Normal  Gaze-Vertical with fixation:   Up: Normal   Down: Normal  Gaze with fixation denied:   Center: Intermittent 1 degree/s right beating  nystagmus   Right: Normal   Left: Normal   Up: Normal  High Frequency Headshake:   Horizontal: Negative; several beats of 1 degree/s right beating nystagmus.  No symptoms.   Vertical: Negative; no nystagmus.  Patient reported some dizziness.    Ohlman-Hallpike Head Right: Negative for PC-BPPV.  Slight 1 degree/s left beating nystagmus while supine with no symptoms.  Patient reports some dizziness (described as a head rush) upon sitting.  Ohlman-Hallpike Head Left: Negative for PC-BPPV.  No nystagmus.  Patient reported some dizziness (described as a head rush) upon sitting.  Roll Test Head Right: Negative for HC-BPPV.  Persistent 2 degrees/s left beating nystagmus.  Patient reported mild dizziness.   Roll Test Head Left: Negative for HC-BPPV.  Intermittent slight 1 degree/s right beating nystagmus.  Patient reported mild dizziness, less than the right side.    Positional Testing:  Positionals: Supine: Intermittent 2 degrees/s left beating nystagmus  Positionals: Body Right: Abnormal: 5 degrees/s left beating nystagmus, suppressed with fixation.  Patient reported dizziness.  Positionals: Body Left: Abnormal: 8 degrees/s right beating nystagmus, suppressed with fixation.  No symptoms.  Positionals: Pre-Caloric: 3 degrees/s left beating nystagmus, suppressed with fixation.  No symptoms.    Oculomotor Tests:  Saccades: Normal  Anti-saccades: Normal; Patient able to perform task  Pursuit: Borderline abnormal morphology with saccadic intrusions. No improvement on repeat.    Calorics :  (Tested at 44 degrees and 30 degrees Celsius for 30 seconds for warm and cool water, respectively):  Right Warm Eye Speed: 5 degrees per second right beating  Left Warm Eye Speed: 30 degrees per second left beating  Right Cool Eye Speed: 3 degrees per second left beating  Left Cool Eye Speed: 11 degrees per second right beating  Difference between ear: 67% right hypofunction. (Greater than 25% considered clinically significant.)  Fixation Index:  Normal  Overall caloric test: Abnormal: 67% right hypofunction found    Post-Calorics Otoscopy: Normal    ASSESSMENT:  1. Indications of central vestibular system involvement noted on today's exam were as follows:   - Borderline abnormal Pursuit testing; repeatable  - Ageotropic nystagmus present in Body Right and Body Left Positionals    2. Indications of peripheral vestibular system involvement noted on today's exam were as follows:   - 67% right hypofunction via Calorics  -Rotary chair results of abnormal low-frequency phase lead with normal symmetry is consistent with unilateral hypofunction that is physiologically compensated  - Mild left beating nystagmus in 2/4 Positionals    PLAN:  Follow-up with Kelsi Middleton M.D. regarding today's results and for medical management.  Please call this clinic at [phone] with questions regarding these results or recommendations.       JENNA Wagner.   Audiology Doctoral Student   [place] #[id number]      I was present with the patient for the entire Audiology appointment including all procedures/testing performed by the AuD student, and agree with the assessment and plan as documented.    Feng Slaughter.  Licensed Audiologist  [place] # [id number]

## 2024-05-08 ENCOUNTER — OFFICE VISIT (OUTPATIENT)
Dept: AUDIOLOGY | Facility: CLINIC | Age: 69
End: 2024-05-08
Payer: COMMERCIAL

## 2024-05-08 DIAGNOSIS — R42 DIZZINESS: ICD-10-CM

## 2024-05-08 DIAGNOSIS — H90.3 ASYMMETRICAL SENSORINEURAL HEARING LOSS: Primary | ICD-10-CM

## 2024-05-08 DIAGNOSIS — R42 DIZZINESS: Primary | ICD-10-CM

## 2024-05-08 DIAGNOSIS — H83.2X1 VESTIBULAR HYPOFUNCTION, RIGHT: Primary | ICD-10-CM

## 2024-05-08 PROCEDURE — 92542 POSITIONAL NYSTAGMUS TEST: CPT | Mod: XU | Performed by: AUDIOLOGIST

## 2024-05-08 PROCEDURE — 92584 ELECTROCOCHLEOGRAPHY: CPT | Performed by: AUDIOLOGIST

## 2024-05-08 PROCEDURE — 92565 STENGER TEST PURE TONE: CPT | Performed by: AUDIOLOGIST

## 2024-05-08 PROCEDURE — 92550 TYMPANOMETRY & REFLEX THRESH: CPT | Performed by: AUDIOLOGIST

## 2024-05-08 PROCEDURE — 92557 COMPREHENSIVE HEARING TEST: CPT | Performed by: AUDIOLOGIST

## 2024-05-08 PROCEDURE — 92541 SPONTANEOUS NYSTAGMUS TEST: CPT | Performed by: AUDIOLOGIST

## 2024-05-08 PROCEDURE — 92545 OSCILLATING TRACKING TEST: CPT | Mod: XU | Performed by: AUDIOLOGIST

## 2024-05-08 PROCEDURE — 92546 SINUSOIDAL ROTATIONAL TEST: CPT | Performed by: AUDIOLOGIST

## 2024-05-08 PROCEDURE — 92537 CALORIC VSTBLR TEST W/REC: CPT | Performed by: AUDIOLOGIST

## 2024-05-08 NOTE — PROGRESS NOTES
AUDIOLOGY REPORT    SUMMARY: Audiology visit completed. See audiogram for results.      RECOMMENDATIONS: Follow-up with ENT.    Feng Slaughter.  Licensed Audiologist  [place] # [id number]

## 2024-05-09 NOTE — PROGRESS NOTES
AUDIOLOGY REPORT    BACKGROUND INFORMATION: David Wu was seen in Audiology at the [facility] on 5/8/2024 for an electrocochleography (ECochG) evaluation, referred by Kelsi Middleton M.D.. The patient reports that he was diagnosed with Ménière's disease about 25 years ago.  He remembers has first episode of dizziness that he could not stand, had severe spinning sensation, and his work had to call an ambulance as he could not walk.  He has had several of these episodes over the years.  Sometimes he has several year, sometimes he is gone a couple years without any.  He has recently had a few episodes, and does not feel like he is back to baseline.  He describes his symptoms as the sound in his right ear will increase significantly, along with ear pressure.  He will then get a true vertigo sensation.  He is unsure if the hearing in his right ear changes during these episodes as he feels the sound in his ear is so loud that it blocks out his hearing . His last episode he also had to go to the emergency room.  He had nausea and vomiting with this episode.  It usually takes a few days to fully recover.  Currently he still reports feeling a very small spinning sensation when looking up or bending over.  He will on occasion have a sensation of persistent motion even when the car has stopped, or riding an elevator.  If he sits or lies still this helps his symptoms.  He does feel if he is walking a straight line that he can tear tend to veer or sway.  He feels in general that he has a feeling of lightheadedness.    David does report a history of headaches.  However he does report a long history of back pain including back surgery, and neck pain.  He is unsure if the headaches are coming from his neck and back pain.  He will have tinnitus and ear fullness in his right ear only.  He denies drainage bilaterally and significant history of ear infections with exception of  childhood.  Dakota reports a history of concussions in high school, as he played football.  He denies getting dizzy to loud sounds or being able to hear his eyes move or blink.  He denies that coughing, sneezing, or blowing his nose provoke symptoms as well as lifting heavy things or bearing down.  He does report some vision concerns as he wears glasses and he has 1 spot of concern.  However he was just at the eye doctor and they are attributing this to a swollen optic nerve about 1 year ago.  He reports it is not currently swollen and that they believe that it has resolved.  He has no history of eye surgeries.  He has no history of cancer or chemotherapy   He does report a history of anxiety and depression, however reports this is under control right now and he is not currently taking any medication.  He reports only taking his blood pressure medication and cholesterol medication in the last 48 hours. The most recent hearing evaluation performed today revealed normal sloping to moderate rising to normal sensorineural hearing loss in the left ear, and a moderate sloping to severe sensorineural hearing loss in the right ear.     TEST RESULTS AND PROCEDURES:   Abuse Screening:  Do you feel unsafe at home or work/school? No  Do you feel threatened by someone? No  Does anyone try to keep you from having contact with others, or doing things outside of your home? No  Physical signs of abuse present? No    Electrocochleography (ECochG) testing is performed using an auditory evoked potentials system.  Surface electrodes are placed behind the test ear with extratympanic tymptrode placed in the test ear in order to obtain a near-field recording that measures the response of the cochlear hair cells and auditory nerve in response to auditory stimuli. The measured response consists of the summating potential (SP) and the cochlear nerve action potential (AP). Abnormalities may take the form of an increased summating  potential/compound action potential (SP/AP) ratio (due to an increased summating potential) in patients suspected of Meniere's disease (endolymphatic hydrops) or in those patients who have symptoms of vestibular dysfunction or ear symptoms including asymmetric or fluctuating hearing loss, tinnitus and/or aural fullness. The following can be suggestive of an abnormal EcochG: SP/AP ratio exceeds 0.43.  Tympanograms showed  normal eardrum mobility bilaterally.   Using a microscope tympanic membranes were visualized.       A two-channel ECochG recording was performed for clicks bilaterally.  Clicks for the right ear showed borderline normal SP/AP ratios.    Clicks for the left ear showed normal SP/AP ratios.         Click SP/AP ratio   Right ear  0.416   Left ear  0.157     Abnormal SP/AP ratios must be greater than .43 for clicks.     SUMMARY AND RECOMMENDATIONS: Today s ECochG revealed normal SP/AP ratios in the left ear, and borderline  SP/AP ratios in the right ear.  Please call this clinic with questions regarding today s results.  Follow-up with Kelsi Middleton M.D. for medical management.            Bertha Lewis, [facility]  Licensed Audiologist  [place] #[id number]

## 2024-05-10 ENCOUNTER — VIRTUAL VISIT (OUTPATIENT)
Dept: OTOLARYNGOLOGY | Facility: CLINIC | Age: 69
End: 2024-05-10
Payer: COMMERCIAL

## 2024-05-10 ENCOUNTER — PRE VISIT (OUTPATIENT)
Dept: OTOLARYNGOLOGY | Facility: CLINIC | Age: 69
End: 2024-05-10

## 2024-05-10 VITALS — BODY MASS INDEX: 29.8 KG/M2 | HEIGHT: 72 IN | WEIGHT: 220 LBS

## 2024-05-10 DIAGNOSIS — H90.3 ASYMMETRICAL SENSORINEURAL HEARING LOSS: ICD-10-CM

## 2024-05-10 DIAGNOSIS — H81.01 MENIERE'S DISEASE, RIGHT: Primary | ICD-10-CM

## 2024-05-10 PROCEDURE — 99443 PR PHYSICIAN TELEPHONE EVALUATION 21-30 MIN: CPT | Mod: 93 | Performed by: OTOLARYNGOLOGY

## 2024-05-10 ASSESSMENT — PAIN SCALES - GENERAL: PAINLEVEL: SEVERE PAIN (7)

## 2024-05-10 NOTE — LETTER
5/10/2024       RE: David Wu  [address] [address] [address]     Dear Colleague,    Thank you for referring your patient, David Wu, to the [facility] EAR NOSE AND THROAT CLINIC [place] at [facility]. Please see a copy of my visit note below.      Neurotology Clinic      David is a 69 year old who is being evaluated via a billable video visit.        Telephone-Visit Details    Type of service:  Telephone Visit   Distant Location (provider location):  On-site  Platform used for Video Visit: Telephone          Name: David Wu  MRN: [id number]  Age: 69 year old  : 1955  Referring provider: Referred Self  05/10/2024      Chief Complaint:   Consultation     History of Present Illness:   David Wu is a 69 year old male who presents for consultation regarding Vertigo. Consultation was requested by  Teena Jones MD. Today, he is calling from his cabin. He has had Menière's disease for over 25-30 years. He has visited many doctors. He reports that had not experienced any episodes of vertigo for 4 years, but had a few over the Fall  season (Oct-Dec). He says that his episode in November took longer to onset than the rest. He says that the vertigo began over 25 years ago. He experiences dizziness and imbalance. He usually tries to lay down and wait. He notes pressure and volume changes a day or so before an episode, with constant tinnitus in the right ear. He recounted an experience helping a neighbor were remove a tree, where he over-exerted himself. His wife and daughter were very concerned about this episode, and had him admitted to the ER. This is because he was unable to get out of bed and could not get up his own. Since December, he has been experiencing dizzy spells. He says that he lays in bed at home in the dark and waits it out, and that sometimes it reoccurs in the morning. He says that the episodes  are progressively getting worse, and lasting for 5-6 hours. His dizzy spells have not been as severe as the episodes during Fall 2023, and the four year gap. He has heard that caffeine and alcohol are known triggers; he tries avoids these. He tries to find foods that contain less sodium, and tried a no-salt diet. He says that his hearing has worsened. He used to be able to pop them, but has not been able to over the past 8 months. He says that if he covers his left ear, his hearing on the right side is poor, and describes a distortion on the right. He feels that he is deaf in that ear. November, it took longer for the onset. He has had some therapy where he used to live, went about three times, and then discontinued because he felt worse afterwards. He has not tried any medications, and just lets the episodes run their course. No prior surgeries or injections. He has tried hearing aids, but did not like them.     05/08/2024 AUDIOLOGY - Amrik Roberson AuD  AUDIOLOGY REPORT-BALANCE ASSESSMENT     SUBJECTIVE: David Wu, 69 year old, was seen in Audiology at the [facility] on 5/8/2024, for videonystagmography (VNG) and rotational chair testing, referred by Kelsi Middleton M.D. Patient has a diagnosis of right Ménière's disease from an outside facility.  Hearing evaluation completed today shows normal sloping to mild sensorineural hearing loss in the left ear, and moderately severe to severe sensorineural hearing loss in the right ear with asymmetrical word recognition.  See [facility] notes with Bertha Alexander for full case history.       David has not taken any antivestibular medications in the past 48 hours.     OBJECTIVE:  Dizziness Handicap Inventory (DHI): 78/100; Severe perceived impairment     Rotational chair testing:   Sinusoidal harmonic acceleration test:  Spontaneous nystagmus: Absent  Phase: Abnormal phase lead at 0.01 Hz, falling into the normal range at 0.02, 0.08,  and 0.32 Hz  Gain: Borderline abnormal at 0.01 Hz, with normal gain at 0.02, 0.08, and 0.32 Hz  Symmetry: Normal at 0.01, 0.02, 0.08, and 0.32 Hz  Spectral Purity: Normal at 0.01, 0.02, 0.08, and 0.32 Hz  Overall rotational chair test: Abnormal phase lead and borderline abnormal gain at 0.01 Hz, with normal gain, phase, and symmetry at 0.02, 0.08, and 0.32 Hz     Videonystagmography (VNG) testing:  Prescreening:  Tympanograms: Tested during hearing evaluation earlier today showed normal eardrum mobility bilaterally. Note: this test is completed to determine the status of the middle ear before irrigations are completed.  Ocular range of motion and ocular counter roll: Normal  Cross/cover:Normal  Head Thrust: Negative      Nystagmus Tests:  Gaze-Horizontal with fixation:              Center: Normal              Right: Normal              Left: Normal  Gaze-Vertical with fixation:              Up: Normal              Down: Normal  Gaze with fixation denied:              Center: Intermittent 1 degree/s right beating nystagmus              Right: Normal              Left: Normal              Up: Normal  High Frequency Headshake:              Horizontal: Negative; several beats of 1 degree/s right beating nystagmus.  No symptoms.              Vertical: Negative; no nystagmus.  Patient reported some dizziness.     Ar-Hallpike Head Right: Negative for PC-BPPV.  Slight 1 degree/s left beating nystagmus while supine with no symptoms.  Patient reports some dizziness (described as a head rush) upon sitting.  Ar-Hallpike Head Left: Negative for PC-BPPV.  No nystagmus.  Patient reported some dizziness (described as a head rush) upon sitting.  Roll Test Head Right: Negative for HC-BPPV.  Persistent 2 degrees/s left beating nystagmus.  Patient reported mild dizziness.   Roll Test Head Left: Negative for HC-BPPV.  Intermittent slight 1 degree/s right beating nystagmus.  Patient reported mild dizziness, less than the right side.      Positional Testing:  Positionals: Supine: Intermittent 2 degrees/s left beating nystagmus  Positionals: Body Right: Abnormal: 5 degrees/s left beating nystagmus, suppressed with fixation.  Patient reported dizziness.  Positionals: Body Left: Abnormal: 8 degrees/s right beating nystagmus, suppressed with fixation.  No symptoms.  Positionals: Pre-Caloric: 3 degrees/s left beating nystagmus, suppressed with fixation.  No symptoms.     Oculomotor Tests:  Saccades: Normal  Anti-saccades: Normal; Patient able to perform task  Pursuit: Borderline abnormal morphology with saccadic intrusions. No improvement on repeat.     Calorics :  (Tested at 44 degrees and 30 degrees Celsius for 30 seconds for warm and cool water, respectively):  Right Warm Eye Speed: 5 degrees per second right beating  Left Warm Eye Speed: 30 degrees per second left beating  Right Cool Eye Speed: 3 degrees per second left beating  Left Cool Eye Speed: 11 degrees per second right beating  Difference between ear: 67% right hypofunction. (Greater than 25% considered clinically significant.)  Fixation Index: Normal  Overall caloric test: Abnormal: 67% right hypofunction found     Post-Calorics Otoscopy: Normal     ASSESSMENT:  1. Indications of central vestibular system involvement noted on today's exam were as follows:   - Borderline abnormal Pursuit testing; repeatable  - Ageotropic nystagmus present in Body Right and Body Left Positionals     2. Indications of peripheral vestibular system involvement noted on today's exam were as follows:   - 67% right hypofunction via Calorics  -Rotary chair results of abnormal low-frequency phase lead with normal symmetry is consistent with unilateral hypofunction that is physiologically compensated  - Mild left beating nystagmus in 2/4 Positionals     PLAN:  Follow-up with Kelsi Middleton M.D. regarding today's results and for medical management.  Please call this clinic at [phone] with questions regarding  these results or recommendations.     05/08/2024 AUDIOLOGY REPORT - Randall Sissy, Kassie  The patient reports that he was diagnosed with Ménière's disease about 25 years ago.  He remembers has first episode of dizziness that he could not stand, had severe spinning sensation, and his work had to call an ambulance as he could not walk.  He has had several of these episodes over the years.  Sometimes he has several year, sometimes he is gone a couple years without any.  He has recently had a few episodes, and does not feel like he is back to baseline.  He describes his symptoms as the sound in his right ear will increase significantly, along with ear pressure.  He will then get a true vertigo sensation.  He is unsure if the hearing in his right ear changes during these episodes as he feels the sound in his ear is so loud that it blocks out his hearing . His last episode he also had to go to the emergency room.  He had nausea and vomiting with this episode.  It usually takes a few days to fully recover.  Currently he still reports feeling a very small spinning sensation when looking up or bending over.  He will on occasion have a sensation of persistent motion even when the car has stopped, or riding an elevator.  If he sits or lies still this helps his symptoms.  He does feel if he is walking a straight line that he can tear tend to veer or sway.  He feels in general that he has a feeling of lightheadedness.     David does report a history of headaches.  However he does report a long history of back pain including back surgery, and neck pain.  He is unsure if the headaches are coming from his neck and back pain.  He will have tinnitus and ear fullness in his right ear only.  He denies drainage bilaterally and significant history of ear infections with exception of childhood.  Dakota reports a history of concussions in high school, as he played football.  He denies getting dizzy to loud sounds or being able to hear his  eyes move or blink.  He denies that coughing, sneezing, or blowing his nose provoke symptoms as well as lifting heavy things or bearing down.  He does report some vision concerns as he wears glasses and he has 1 spot of concern.  However he was just at the eye doctor and they are attributing this to a swollen optic nerve about 1 year ago.  He reports it is not currently swollen and that they believe that it has resolved.  He has no history of eye surgeries.  He has no history of cancer or chemotherapy   He does report a history of anxiety and depression, however reports this is under control right now and he is not currently taking any medication.  He reports only taking his blood pressure medication and cholesterol medication in the last 48 hours. The most recent hearing evaluation performed today revealed normal sloping to moderate rising to normal sensorineural hearing loss in the left ear, and a moderate sloping to severe sensorineural hearing loss in the right ear.      A two-channel [facility] recording was performed for clicks bilaterally.  Clicks for the right ear showed borderline normal SP/AP ratios.    Clicks for the left ear showed normal SP/AP ratios.          Click SP/AP ratio   Right ear  0.416   Left ear  0.157      Abnormal SP/AP ratios must be greater than .43 for clicks.      SUMMARY AND RECOMMENDATIONS: Today s [facility] revealed normal SP/AP ratios in the left ear, and borderline  SP/AP ratios in the right ear.  Please call this clinic with questions regarding today s results.  Follow-up with Kelsi Middleton M.D. for medical management.     11/14/23- [facility] - Hans Perez MD  HPI : The patient is a 68-year-old male presenting with dizziness.  He has a known history of Ménière's disease.  This was diagnosed about 25 years ago.  He has not had follow-up with ENT for about 5 years.  He has not had any procedures performed on the ears.  He does not take medication on a regular basis for Ménière's  "disease.     The patient presents with recurrent/persistent dizziness, tinnitus, and hearing loss involving the right ear.  He had another episode start and it is moderate to severe.  Movement will worsen his symptoms but he does describe dizziness that to some extent is constant.  He will have associated nausea.  No new headache or neck pain.  No trauma or injury.  No vision changes, facial droop, one-sided weakness or incoordination.  He admits to being extremely frustrated and angry with his situation.  He said, \"I think I frightened my family because I sort of lost it.\"  He is not currently suicidal or homicidal.    The patient needs to follow-up with ENT.  There are a number of treatment options which can be tried and I tried to help him understand this.  I provided a long description of what is available according to the up-to-date database.  He had an ENT referral order placed by his primary physician this morning.  He is calling local ENT clinics.  I encouraged him to call ahead to an ENT clinic in [place] where he is heading in December.  Ativan prescribed.  Zofran prescribed.    10/10/23- ED - Jimmy Waite MD  CC: Vertigo     HPI :David Wu is a 68 year old male who presents to the emergency department reporting 3 days of vertigo with nausea and vomiting.  This has been constant and does not seem to be worsened by anything in particular.  Patient does report that he has had similar presentation in the past.  He reports that his right ear feels clogged and he is having some tinnitus.  He has been unable to walk today secondary to the severity of his symptoms.  No recent head injury.  He denies headache, change in vision, double vision, numbness or weakness of extremities or difficulty swallowing or speaking.      This patient presented emergency department complaining of severe vertigo associated with nausea and vomiting and inability to ambulate because he felt off balance.  He does have " rotatory nystagmus on exam.  Given his age and severity of symptoms I did obtain an MRI/MRA.  This demonstrated no evidence of posterior circulation stroke or cervical vessel disease.  Opacification of the left sphenoid sinus is noted.  Patient felt much improved after dose of IV Ativan and IV fluid.  He was able to tolerate oral intake and was able to ambulate independently.  At this point time, he is comfortable going home.  I will treat for sinusitis with Augmentin and recommended Afrin nasal spray as well as meclizine if needed for symptoms.  He was told to return to the emergency department for worsening or return of symptoms as he may need hospitalization for symptom control and IV hydration.  He was discharged with his family in good condition.    12/12/23- PORFIRIO Jones, Teena Feng MD  History of Present Illness: David Wu is a 68 y.o. male here for evaluation of Meniere's disease. Has history of asymmetric hearing loss in right ear. Has had Meniere's disease diagnosis for 20+ years. David reports that their hearing has significantly worsened in the right ear recently. They also have complaints of bothersome ringing tinnitus in the right ear and a plugging sensation. They have vertigo episodes which first began about 4 years ago and seemed to go away. However, they have had two episodes in the past few months which have sent them to the emergency room. They report that the vertigo will come and go, but lately they feel like they could have another attack at any minute. Last bad attack was about a month ago. No otalgia, otorrhea. MRI at [facility] 10/10/23 during ED visit for vertigo.    Assessment: (H81.01) Meniere's disease of right ear (primary encounter diagnosis)    Plan: AMB CONSULT TO AUDIOLOGY AND ENT, AMB CONSULT TO PHYSICAL THERAPY, predniSONE (DELTASONE) 20 mg tablet, AMB CONSULT TO AUDIOLOGY AND ENT  Long term Meniere's disease in right ear. Discussed low salt diet, 1500mg daily.  Discussed PT for vestibular rehab. Discussed course of prednisone for current exacerbation. Discussed referral to the [facility] for surgical consultation. Briefly discussed labyrinthectomy. He is interested in this.    12/12/23- OV Ashley Merrill AuD    Comprehensive Hearing Evaluation    REFERRING PROVIDER: Hans Haley MD    CHIEF COMPLAINT/REASON FOR VISIT: Meniere's Disease    SUBJECTIVE:  David Wu (68 y.o.), comes in today for a hearing evaluation at [facility], unaccompanied. They are being seen today by Teena Jones MD in the Ear, Nose, and Throat department. Please see their note in addition.    Recall that David has known asymmetric hearing loss which was last measured in our office as mild to moderately-severe sensorineural hearing loss in the right ear with essentially normal hearing in the left ear (DOS: 10/11/19).    David reports that their hearing has significantly worsened in the right ear. They also have complaints of bothersome ringing tinnitus in the right ear and a plugging sensation. They have vertigo episodes which first began about 4 years ago and seemed to go away. However, they have had two episodes in the past few months which have sent them to the emergency room. They report that the vertigo will come and go, but lately they feel like they could have another attack at any minute.    They deny otalgia, otorrhea, and dizziness/imbalance.    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 They deny history of ear surgery and history of ear/head trauma.    OBJECTIVE:  Otoscopic Examination  RIGHT: External ear canal clear of debris. Visualized tympanic membrane.  LEFT: External ear canal clear of debris. Visualized tympanic membrane.    226 Hz Tympanogram  RIGHT: Type A -- within normal  limits for impedance with normal ear canal volume  LEFT: Type A -- within normal limits for impedance with normal ear canal volume    Speech Recption Threshold using Insert earphones  RIGHT: 70 dBHL  LEFT: 20 dBHL  Method: MLV Spondee word list    Pure Tone Thresholds using Insert earphones  RIGHT: essentially moderatley-severe (250-8000 Hz) sensorineural hearing loss  LEFT: slight/normal (250-3000 Hz) sloping to mild/slight (9735-5361 Hz) rising to normal (8000 Hz) sensorineural hearing loss  RE 10/11/19: Left ear has decreased 5-10 dB across 250-4000 Hz; right ear has decreased 35-50 dB across 250-1000 Hz and 5-15 dB across 2-8 kHz    Word Recognition Score (WRS) using Insert earphones  RIGHT: 44% correct at 85 dBHL with 55 dBHL masking  LEFT: 100% correct at 80 dBHL with 50 dBHL masking  Word List(s): Recorded NU6 by difficulty word lists (25 word lists) version one    Note: Right ear has decreased from 84%    ASSESSMENT:  Puretone audiometry revealed asymmetric sensorineural hearing loss which is essentially slight/mild sensorineural hearing loss in the left ear and essentially moderately severe sensorineural hearing loss in the right ear. Since their last test in 2019, the right ear has significantly decreased. SRTs were consistent with the pure tone testing, indicating good test reliability. Word recognition was excellent in the left ear and poor in the right ear. Word recognition has significantly decreased in the right ear. Tympanometry was consistent with intact tympanic membranes with normal middle ear pressure and middle ear mobility, bilaterally.    10/11/19- ov / audiogram Bianca Virk PA  & Bridget Santiago, Kassie  SUBJECTIVE: David Wu is a 64 y.o. male who comes in today for a hearing evaluation at [facility] prior to an appointment with Bianca Virk PA-C in the ENT department. He is accompanied by his wife Carol. David reports a diagnosis of Meniere's  "disease for his right ear. He previously underwent hearing and vestibular evaluation in [place] in February. He states he had a severe episode of vertigo and right-sided hearing loss with tinnitus lasting about 1 month earlier this fall, but that his symptoms have \"subsided\" somewhat in the past couple of weeks. He states he has a baseline level of \"ringing\" tinnitus in the right ear that becomes exacerbated with episodes of dizziness and drops in hearing. David denies ear infections, otalgia or otorrhea. He denies hearing aid use or prior otologic surgery. He is referred by Bianca Virk PA-C / IRENE Wan.    OBJECTIVE:  Otoscopic Examination: Otoscopic examination was performed and revealed clear ear canals bilaterally.  Pure Tone Thresholds:  RIGHT: Mild to moderately severe sensorineural hearing loss in slightly peaked/mostly sloping configuration (borderline normal threshold at 500 Hz; 15-dB air-bone gap at 4 kHz only)  LEFT: Normal hearing thresholds with exception of a mild to slight sensorineural hearing loss notch from 4-6 kHz (15-dB air-bone gap at 4 kHz only)  Tympanometry:  Right tympanogram: Type A with normal ear canal volume  Left tympanogram: Type A with normal ear canal volume  Speech Reception Threshold:  RIGHT: 35 dB HL  LEFT: 25 dB HL  Word Recognition Score:  RIGHT: 84% at 75 dB HL with NU-6 word list.  LEFT: 100% at 65 dB HL with NU-6 word list.    ASSESSMENT: Results of pure tone audiometry were as described above, indicating asymmetric sensorineural hearing loss, right ear worse than left, but with right ear showing significant improvement in the low to mid frequencies compared to February testing at outside facility. Tympanometry was consistent with intact and appropriately mobile tympanic membranes with normal middle ear pressure bilaterally. SRTs were consistent with the pure tone testing, indicating good test reliability. Word recognition was excellent left and good " right.    PLAN: Results were discussed with David. He was counseled about hearing loss and its impact on communication. He is a candidate for a hearing aid in the right ear only, and we discussed the underlying connections between hearing loss and tinnitus today. We also discussed potential benefits from a hearing aid in the right ear including not only hearing improvement but also possible improvement (reduction) in tinnitus severity and/or awareness. David is ramos that not all patients find this benefit.     Review of Systems:   Pertinent items are noted in HPI.        No data to display                 Active Medications:     Current Outpatient Medications:     ASPIRIN 325 MG PO TABS, 2 TABLETS EVERY 4 TO 6 HOURS AS NEEDED, Disp: , Rfl:     Atorvastatin Calcium (LIPITOR PO), , Disp: , Rfl:     Cholecalciferol (VITAMIN D) 2000 UNIT CAPS, Take 2,000 mg by mouth daily., Disp: , Rfl:     HYDROCHLOROTHIAZIDE 50 MG OR TABS, 1 TABLET DAILY, Disp: , Rfl:     hydrOXYzine (VISTARIL) 25 MG capsule, Take 1 pill every 6 hours as needed for pain., Disp: 50 capsule, Rfl: 0    LORazepam (ATIVAN) 1 MG tablet, Take 1 tablet (1 mg) by mouth 2 times daily as needed (dizziness, anxiety, nausea), Disp: 15 tablet, Rfl: 0    losartan (COZAAR) 25 MG tablet, Take 0.5 tablets by mouth daily, Disp: , Rfl:     lovastatin (MEVACOR) 40 MG tablet, Take 40 mg by mouth daily, Disp: , Rfl:     meclizine (ANTIVERT) 12.5 MG tablet, Take 1 tablet (12.5 mg) by mouth 4 times daily as needed for dizziness, Disp: 30 tablet, Rfl: 0    metFORMIN (GLUCOPHAGE XR) 500 MG 24 hr tablet, Take 500 mg by mouth, Disp: , Rfl:     moxifloxacin (VIGAMOX) 0.5 % ophthalmic solution, Place 1 drop into the right eye 4 times daily, Disp: 3 mL, Rfl: 0    Omega-3 Fatty Acids (FISH OIL) 1000 MG CPDR, Take 2,000 mg by mouth daily., Disp: , Rfl:     ondansetron (ZOFRAN ODT) 4 MG ODT tab, Take 1-2 tablets (4-8 mg) by mouth every 8 hours as needed for nausea, Disp: 12 tablet,  Rfl: 0    oxyCODONE (ROXICODONE) 5 MG immediate release tablet, Take 1 tablet (5 mg) by mouth every 4 hours as needed for moderate to severe pain, Disp: 60 tablet, Rfl: 0    temazepam (RESTORIL) 15 MG capsule, Take 15 mg by mouth, Disp: , Rfl:     traZODone (DESYREL) 50 MG tablet, Take 1 tablet by mouth At Bedtime, Disp: , Rfl:     Current Facility-Administered Medications:     carboxymethylcellulose PF (REFRESH PLUS) 0.5 % ophthalmic solution 1 drop, 1 drop, Both Eyes, 4x Daily PRN, Nantete Tellez MD      Allergies:   Lisinopril      Past Medical History:  Past Medical History:   Diagnosis Date    Hypertension      Patient Active Problem List   Diagnosis    Contusion shoulder/arm    Epicondylitis, lateral humeral    Lateral epicondylitis    Pain in joint, upper arm    Synovitis and tenosynovitis    Foreign body of right ear    Tinnitus    Impingement syndrome, shoulder    Paresthesias/numbness    MRSA infection    CARDIOVASCULAR SCREENING; LDL GOAL LESS THAN 130    Arthrosis of right acromioclavicular joint    Biceps tendonitis    Rotator cuff tendinitis    Hypertension goal BP (blood pressure) < 140/90        Past Surgical History:  Past Surgical History:   Procedure Laterality Date    ARTHROSCOPY SHOULDER, OPEN DISTAL CLAVICLE REPAIR, COMBINED Right 12/17/2014    Procedure: COMBINED ARTHROSCOPY SHOULDER, OPEN DISTAL CLAVICLE RESECTION;  Surgeon: Cristóbal Rose MD;  Location: [facility] OR    ARTHROSCOPY SHOULDER, OPEN ROTATOR CUFF REPAIR, COMBINED Right 12/17/2014    Procedure: COMBINED ARTHROSCOPY SHOULDER, OPEN ROTATOR CUFF REPAIR;  Surgeon: Cristóbal Rose MD;  Location: [facility] OR    NO HISTORY OF SURGERY         Family History:   Family History   Problem Relation Age of Onset    Arthritis Mother     Arthritis Father     Diabetes No family hx of     Glaucoma No family hx of     Macular Degeneration No family hx of          Social History:   Social History     Tobacco Use    Smoking status: Former      Current packs/day: 0.00     Types: Cigarettes     Quit date: 1989     Years since quittin.1    Smokeless tobacco: Never   Substance Use Topics    Alcohol use: Yes     Comment: 8-10 beers weekly    Drug use: No        Physical Exam:   No vitals were obtained today due to virtual visit.  PSYCH: Alert and oriented times 3; coherent speech, normal   rate and volume, able to articulate logical thoughts, able   to abstract reason, no tangential thoughts, no hallucinations   or delusions   RESP: No cough, no audible wheezing, able to talk in full sentences  Remainder of exam unable to be completed due to telephone visits    Audiogram:  AUDIOGRAM: He underwent an audiogram today (2024). This demonstrated:  Results: Left: Normal sloping to mild SNHL rising to normal hearing. Right: Moderate sloping to severe SNHL. Tymps WNL bilaterally.  Reflexes: Ipsi reflexes present at normal levels, contra reflexes elevated. Asymmetrical word rec noted.        Right: Speech reception threshold is 55 dB with 60% word recognition. Tympanogram A type   Left: Speech reception threshold is 20 dB with 100% word recognition. Tympanogram A type     2023 AUDIOGRAM [MISSING 23 audiogram]  HISTORY: vertigo, tinnitus, hearing loss  OTOSCOPY: clear EAC, each ear; both TMs were visualized  TYMPANOMETRY: Type A; each ear  AUDIOGRAM: RIGHT: essentially moderately-severe (205-3000 Hz) sloping to mild/slight (4000-6000Hz) rising to normal (8000Hz) sensorineural hearing loss.   RE 10/11/19: left war has decreased 5-10 dB across 250-4000Hz; right ear has decreased 35-50 dB across 250-1000 Hz and 5-15 dB across  2-8 kHz.       Audiogram was independently reviewed    Imaging:  MRA NECK WITHOUT AND WITH CONTRAST  (10/10/2023)   HISTORY: 3 days of constant vertigo      COMPARISON: None.      FINDINGS:    Normal origin of the great vessels from the aortic arch.      Right carotid artery: The right common and internal carotid arteries  are patent. No significant stenosis.      Left carotid artery: The left common and internal carotid arteries are patent. No significant stenosis.      Vertebral arteries: Vertebral arteries appear patent without evidence of dissection. No significant stenosis.      IMPRESSION:  No stenosis/occlusion or dissection.     MRI BRAIN WITHOUT AND WITH CONTRAST (10/10/2023)  HISTORY: 3 days of constant vertigo.    COMPARISON: None.    FINDINGS: Questionable punctate focus of diffusion restriction in the Impression    IMPRESSION:  Questionable punctate focus of diffusion restriction in the left parietal periventricular white matter, which is favored to be artifactual, although a tiny acute infarct is possible.  Background age-related chronic microvascular ischemia.    MR ANGIOGRAM OF THE HEAD WITHOUT CONTRAST (10/10/2023)  HISTORY: 3 days of constant vertigo    COMPARISON: None.    FINDINGS: The major intracranial arteries including the proximal impression.    IMPRESSION:  Normal MRA of the [place] without large vessel occlusion.    Outside records from [facility] were independently reviewed.       Assessment and Plan:  David Wu is a 69 year old male who presents for consultation regarding vertigo secondary to right Meniere's disease.     His hearing exam demonstrates normal to mild sensorineural hearing loss on the left side and moderately to severe to severe sensorineural hearing loss on the right side; with asymmetrical word recognition.   I reviewed his vestibular testing results, and he has a right caloric weakness of 37% and ECOG supports hydrops.  He expressed concern regarding tinnitus, and he is very interested in addressing this.He estimates that his dizzy spells are occurring 6-weeks apart.    He came today interested in ablation. We discussed the range of options from sodium restriction, diuretic, IT steroids, IT gentamicin, and labyrinthectomy with or without  cochlear implantation.  It was made clear to the patient that ablation would not remove the tinnitus. If he were to have a labyrinthectomy, a cochlear implant could be placed and tinnitus may be improved. He recounted his cousin having a cochlear implant that became infected and had to be removed, which he is concerned about. Surgical options for treating Meniere's disease have been discussed. He has been advised to remain very hydrated and avoid alcohol, caffiene, and sodium.      I recommend that we start with a nonablative option.  He will present for an IT dexamethasone injection and monitor his symptoms. He has decided to proceed with this plan with his next follow-up.     Follow-up: Please return to clinic for an injection.     Scribe Disclosure:   I, Caren Rosario, am serving as a scribe; to document services personally performed by Kelsi Middleton MD -based on data collection and the provider's statements to me.     Provider Disclosure:  I agree with above History, Review of Systems, Physical exam and Plan.  I have reviewed the content of the documentation and have edited it as needed. I have personally performed the services documented here and the documentation accurately represents those services and the decisions I have made.      Electronically signed by:  Kelsi Middleton MD  Otology & Neurotology  [facility]    Phone call duration: 32 minutes   Start Time: 11:41 CST  End Time:  12:14 CST

## 2024-05-10 NOTE — PROGRESS NOTES
Neurotology Clinic      David is a 69 year old who is being evaluated via a billable video visit.        Telephone-Visit Details    Type of service:  Telephone Visit   Distant Location (provider location):  On-site  Platform used for Video Visit: Telephone          Name: David Wu  MRN: [id number]  Age: 69 year old  : 1955  Referring provider: Referred Self  05/10/2024      Chief Complaint:   Consultation     History of Present Illness:   David Wu is a 69 year old male who presents for consultation regarding Vertigo. Consultation was requested by  Teena Jones MD. Today, he is calling from his cabin. He has had Menière's disease for over 25-30 years. He has visited many doctors. He reports that had not experienced any episodes of vertigo for 4 years, but had a few over the 2023 season (Oct-Dec). He says that his episode in November took longer to onset than the rest. He says that the vertigo began over 25 years ago. He experiences dizziness and imbalance. He usually tries to lay down and wait. He notes pressure and volume changes a day or so before an episode, with constant tinnitus in the right ear. He recounted an experience helping a neighbor were remove a tree, where he over-exerted himself. His wife and daughter were very concerned about this episode, and had him admitted to the ER. This is because he was unable to get out of bed and could not get up his own. Since December, he has been experiencing dizzy spells. He says that he lays in bed at home in the dark and waits it out, and that sometimes it reoccurs in the morning. He says that the episodes are progressively getting worse, and lasting for 5-6 hours. His dizzy spells have not been as severe as the episodes during 2023, and the four year gap. He has heard that caffeine and alcohol are known triggers; he tries avoids these. He tries to find foods that contain less sodium, and tried a no-salt diet. He says  that his hearing has worsened. He used to be able to pop them, but has not been able to over the past 8 months. He says that if he covers his left ear, his hearing on the right side is poor, and describes a distortion on the right. He feels that he is deaf in that ear. November, it took longer for the onset. He has had some therapy where he used to live, went about three times, and then discontinued because he felt worse afterwards. He has not tried any medications, and just lets the episodes run their course. No prior surgeries or injections. He has tried hearing aids, but did not like them.     05/08/2024 AUDIOLOGY - Amrik Roberson AuD  AUDIOLOGY REPORT-BALANCE ASSESSMENT     SUBJECTIVE: David Wu, 69 year old, was seen in Audiology at the [facility] on 5/8/2024, for videonystagmography (VNG) and rotational chair testing, referred by Kelsi Middleton M.D. Patient has a diagnosis of right Ménière's disease from an outside facility.  Hearing evaluation completed today shows normal sloping to mild sensorineural hearing loss in the left ear, and moderately severe to severe sensorineural hearing loss in the right ear with asymmetrical word recognition.  See [facility] notes with Bertha Alexander for full case history.       David has not taken any antivestibular medications in the past 48 hours.     OBJECTIVE:  Dizziness Handicap Inventory (DHI): 78/100; Severe perceived impairment     Rotational chair testing:   Sinusoidal harmonic acceleration test:  Spontaneous nystagmus: Absent  Phase: Abnormal phase lead at 0.01 Hz, falling into the normal range at 0.02, 0.08, and 0.32 Hz  Gain: Borderline abnormal at 0.01 Hz, with normal gain at 0.02, 0.08, and 0.32 Hz  Symmetry: Normal at 0.01, 0.02, 0.08, and 0.32 Hz  Spectral Purity: Normal at 0.01, 0.02, 0.08, and 0.32 Hz  Overall rotational chair test: Abnormal phase lead and borderline abnormal gain at 0.01 Hz, with normal gain, phase,  and symmetry at 0.02, 0.08, and 0.32 Hz     Videonystagmography (VNG) testing:  Prescreening:  Tympanograms: Tested during hearing evaluation earlier today showed normal eardrum mobility bilaterally. Note: this test is completed to determine the status of the middle ear before irrigations are completed.  Ocular range of motion and ocular counter roll: Normal  Cross/cover:Normal  Head Thrust: Negative      Nystagmus Tests:  Gaze-Horizontal with fixation:              Center: Normal              Right: Normal              Left: Normal  Gaze-Vertical with fixation:              Up: Normal              Down: Normal  Gaze with fixation denied:              Center: Intermittent 1 degree/s right beating nystagmus              Right: Normal              Left: Normal              Up: Normal  High Frequency Headshake:              Horizontal: Negative; several beats of 1 degree/s right beating nystagmus.  No symptoms.              Vertical: Negative; no nystagmus.  Patient reported some dizziness.     Ar-Hallpike Head Right: Negative for PC-BPPV.  Slight 1 degree/s left beating nystagmus while supine with no symptoms.  Patient reports some dizziness (described as a head rush) upon sitting.  Ar-Hallpike Head Left: Negative for PC-BPPV.  No nystagmus.  Patient reported some dizziness (described as a head rush) upon sitting.  Roll Test Head Right: Negative for HC-BPPV.  Persistent 2 degrees/s left beating nystagmus.  Patient reported mild dizziness.   Roll Test Head Left: Negative for HC-BPPV.  Intermittent slight 1 degree/s right beating nystagmus.  Patient reported mild dizziness, less than the right side.     Positional Testing:  Positionals: Supine: Intermittent 2 degrees/s left beating nystagmus  Positionals: Body Right: Abnormal: 5 degrees/s left beating nystagmus, suppressed with fixation.  Patient reported dizziness.  Positionals: Body Left: Abnormal: 8 degrees/s right beating nystagmus, suppressed with fixation.  No  symptoms.  Positionals: Pre-Caloric: 3 degrees/s left beating nystagmus, suppressed with fixation.  No symptoms.     Oculomotor Tests:  Saccades: Normal  Anti-saccades: Normal; Patient able to perform task  Pursuit: Borderline abnormal morphology with saccadic intrusions. No improvement on repeat.     Calorics :  (Tested at 44 degrees and 30 degrees Celsius for 30 seconds for warm and cool water, respectively):  Right Warm Eye Speed: 5 degrees per second right beating  Left Warm Eye Speed: 30 degrees per second left beating  Right Cool Eye Speed: 3 degrees per second left beating  Left Cool Eye Speed: 11 degrees per second right beating  Difference between ear: 67% right hypofunction. (Greater than 25% considered clinically significant.)  Fixation Index: Normal  Overall caloric test: Abnormal: 67% right hypofunction found     Post-Calorics Otoscopy: Normal     ASSESSMENT:  1. Indications of central vestibular system involvement noted on today's exam were as follows:   - Borderline abnormal Pursuit testing; repeatable  - Ageotropic nystagmus present in Body Right and Body Left Positionals     2. Indications of peripheral vestibular system involvement noted on today's exam were as follows:   - 67% right hypofunction via Calorics  -Rotary chair results of abnormal low-frequency phase lead with normal symmetry is consistent with unilateral hypofunction that is physiologically compensated  - Mild left beating nystagmus in 2/4 Positionals     PLAN:  Follow-up with Kelsi Middleton M.D. regarding today's results and for medical management.  Please call this clinic at [phone] with questions regarding these results or recommendations.     05/08/2024 AUDIOLOGY REPORT - Sissy Pereira, Kassie  The patient reports that he was diagnosed with Ménière's disease about 25 years ago.  He remembers has first episode of dizziness that he could not stand, had severe spinning sensation, and his work had to call an ambulance as he could  not walk.  He has had several of these episodes over the years.  Sometimes he has several year, sometimes he is gone a couple years without any.  He has recently had a few episodes, and does not feel like he is back to baseline.  He describes his symptoms as the sound in his right ear will increase significantly, along with ear pressure.  He will then get a true vertigo sensation.  He is unsure if the hearing in his right ear changes during these episodes as he feels the sound in his ear is so loud that it blocks out his hearing . His last episode he also had to go to the emergency room.  He had nausea and vomiting with this episode.  It usually takes a few days to fully recover.  Currently he still reports feeling a very small spinning sensation when looking up or bending over.  He will on occasion have a sensation of persistent motion even when the car has stopped, or riding an elevator.  If he sits or lies still this helps his symptoms.  He does feel if he is walking a straight line that he can tear tend to veer or sway.  He feels in general that he has a feeling of lightheadedness.     David does report a history of headaches.  However he does report a long history of back pain including back surgery, and neck pain.  He is unsure if the headaches are coming from his neck and back pain.  He will have tinnitus and ear fullness in his right ear only.  He denies drainage bilaterally and significant history of ear infections with exception of childhood.  Dakota reports a history of concussions in high school, as he played football.  He denies getting dizzy to loud sounds or being able to hear his eyes move or blink.  He denies that coughing, sneezing, or blowing his nose provoke symptoms as well as lifting heavy things or bearing down.  He does report some vision concerns as he wears glasses and he has 1 spot of concern.  However he was just at the eye doctor and they are attributing this to a swollen optic nerve  about 1 year ago.  He reports it is not currently swollen and that they believe that it has resolved.  He has no history of eye surgeries.  He has no history of cancer or chemotherapy   He does report a history of anxiety and depression, however reports this is under control right now and he is not currently taking any medication.  He reports only taking his blood pressure medication and cholesterol medication in the last 48 hours. The most recent hearing evaluation performed today revealed normal sloping to moderate rising to normal sensorineural hearing loss in the left ear, and a moderate sloping to severe sensorineural hearing loss in the right ear.      A two-channel [facility] recording was performed for clicks bilaterally.  Clicks for the right ear showed borderline normal SP/AP ratios.    Clicks for the left ear showed normal SP/AP ratios.          Click SP/AP ratio   Right ear  0.416   Left ear  0.157      Abnormal SP/AP ratios must be greater than .43 for clicks.      SUMMARY AND RECOMMENDATIONS: Today s [facility] revealed normal SP/AP ratios in the left ear, and borderline  SP/AP ratios in the right ear.  Please call this clinic with questions regarding today s results.  Follow-up with Kelsi Middleton M.D. for medical management.     11/14/23- [facility] - Hans Perez MD  HPI : The patient is a 68-year-old male presenting with dizziness.  He has a known history of Ménière's disease.  This was diagnosed about 25 years ago.  He has not had follow-up with ENT for about 5 years.  He has not had any procedures performed on the ears.  He does not take medication on a regular basis for Ménière's disease.     The patient presents with recurrent/persistent dizziness, tinnitus, and hearing loss involving the right ear.  He had another episode start and it is moderate to severe.  Movement will worsen his symptoms but he does describe dizziness that to some extent is constant.  He will have associated nausea.  No new headache  "or neck pain.  No trauma or injury.  No vision changes, facial droop, one-sided weakness or incoordination.  He admits to being extremely frustrated and angry with his situation.  He said, \"I think I frightened my family because I sort of lost it.\"  He is not currently suicidal or homicidal.    The patient needs to follow-up with ENT.  There are a number of treatment options which can be tried and I tried to help him understand this.  I provided a long description of what is available according to the up-to-date database.  He had an ENT referral order placed by his primary physician this morning.  He is calling local ENT clinics.  I encouraged him to call ahead to an ENT clinic in [place] where he is heading in December.  Ativan prescribed.  Zofran prescribed.    10/10/23- ED - Jimmy Waite MD  CC: Vertigo     HPI :David Wu is a 68 year old male who presents to the emergency department reporting 3 days of vertigo with nausea and vomiting.  This has been constant and does not seem to be worsened by anything in particular.  Patient does report that he has had similar presentation in the past.  He reports that his right ear feels clogged and he is having some tinnitus.  He has been unable to walk today secondary to the severity of his symptoms.  No recent head injury.  He denies headache, change in vision, double vision, numbness or weakness of extremities or difficulty swallowing or speaking.      This patient presented emergency department complaining of severe vertigo associated with nausea and vomiting and inability to ambulate because he felt off balance.  He does have rotatory nystagmus on exam.  Given his age and severity of symptoms I did obtain an MRI/MRA.  This demonstrated no evidence of posterior circulation stroke or cervical vessel disease.  Opacification of the left sphenoid sinus is noted.  Patient felt much improved after dose of IV Ativan and IV fluid.  He was able to tolerate oral " intake and was able to ambulate independently.  At this point time, he is comfortable going home.  I will treat for sinusitis with Augmentin and recommended Afrin nasal spray as well as meclizine if needed for symptoms.  He was told to return to the emergency department for worsening or return of symptoms as he may need hospitalization for symptom control and IV hydration.  He was discharged with his family in good condition.    12/12/23- OV Teena Jones MD  History of Present Illness: David Wu is a 68 y.o. male here for evaluation of Meniere's disease. Has history of asymmetric hearing loss in right ear. Has had Meniere's disease diagnosis for 20+ years. David reports that their hearing has significantly worsened in the right ear recently. They also have complaints of bothersome ringing tinnitus in the right ear and a plugging sensation. They have vertigo episodes which first began about 4 years ago and seemed to go away. However, they have had two episodes in the past few months which have sent them to the emergency room. They report that the vertigo will come and go, but lately they feel like they could have another attack at any minute. Last bad attack was about a month ago. No otalgia, otorrhea. MRI at [facility] 10/10/23 during ED visit for vertigo.    Assessment: (H81.01) Meniere's disease of right ear (primary encounter diagnosis)    Plan: AMB CONSULT TO AUDIOLOGY AND ENT, AMB CONSULT TO PHYSICAL THERAPY, predniSONE (DELTASONE) 20 mg tablet, AMB CONSULT TO AUDIOLOGY AND ENT  Long term Meniere's disease in right ear. Discussed low salt diet, 1500mg daily. Discussed PT for vestibular rehab. Discussed course of prednisone for current exacerbation. Discussed referral to the [facility] for surgical consultation. Briefly discussed labyrinthectomy. He is interested in this.    12/12/23- OV Ashley Merrill AuD    Comprehensive Hearing Evaluation    REFERRING PROVIDER: Hans Haley MD    CHIEF  COMPLAINT/REASON FOR VISIT: Meniere's Disease    SUBJECTIVE:  David Wu (68 y.o.), comes in today for a hearing evaluation at [facility] [place]. They are being seen today by Teena Jones MD in the Ear, Nose, and Throat department. Please see their note in addition.    Recall that David has known asymmetric hearing loss which was last measured in our office as mild to moderately-severe sensorineural hearing loss in the right ear with essentially normal hearing in the left ear (DOS: 10/11/19).    David reports that their hearing has significantly worsened in the right ear. They also have complaints of bothersome ringing tinnitus in the right ear and a plugging sensation. They have vertigo episodes which first began about 4 years ago and seemed to go away. However, they have had two episodes in the past few months which have sent them to the emergency room. They report that the vertigo will come and go, but lately they feel like they could have another attack at any minute.    They deny otalgia, otorrhea, and dizziness/imbalance.    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 They deny history of ear surgery and history of ear/head trauma.    OBJECTIVE:  Otoscopic Examination  RIGHT: External ear canal clear of debris. Visualized tympanic membrane.  LEFT: External ear canal clear of debris. Visualized tympanic membrane.    226 Hz Tympanogram  RIGHT: Type A -- within normal limits for impedance with normal ear canal volume  LEFT: Type A -- within normal limits for impedance with normal ear canal volume    Speech Recption Threshold using Insert earphones  RIGHT: 70 dBHL  LEFT: 20 dBHL  Method: MLV Spondee word list    Pure Tone Thresholds using Insert earphones  RIGHT: essentially moderatley-severe (250-8000  "Hz) sensorineural hearing loss  LEFT: slight/normal (250-3000 Hz) sloping to mild/slight (9646-5119 Hz) rising to normal (8000 Hz) sensorineural hearing loss  RE 10/11/19: Left ear has decreased 5-10 dB across 250-4000 Hz; right ear has decreased 35-50 dB across 250-1000 Hz and 5-15 dB across 2-8 kHz    Word Recognition Score (WRS) using Insert earphones  RIGHT: 44% correct at 85 dBHL with 55 dBHL masking  LEFT: 100% correct at 80 dBHL with 50 dBHL masking  Word List(s): Recorded NU6 by difficulty word lists (25 word lists) version one    Note: Right ear has decreased from 84%    ASSESSMENT:  Puretone audiometry revealed asymmetric sensorineural hearing loss which is essentially slight/mild sensorineural hearing loss in the left ear and essentially moderately severe sensorineural hearing loss in the right ear. Since their last test in 2019, the right ear has significantly decreased. SRTs were consistent with the pure tone testing, indicating good test reliability. Word recognition was excellent in the left ear and poor in the right ear. Word recognition has significantly decreased in the right ear. Tympanometry was consistent with intact tympanic membranes with normal middle ear pressure and middle ear mobility, bilaterally.    10/11/19- ov / audiogram Bianca Virk PA  & Bridget Santiago, Kassie  SUBJECTIVE: David Wu is a 64 y.o. male who comes in today for a hearing evaluation at [facility] prior to an appointment with Bianca Virk PA-C in the ENT department. He is accompanied by his wife Carol. David reports a diagnosis of Meniere's disease for his right ear. He previously underwent hearing and vestibular evaluation in [place] in February. He states he had a severe episode of vertigo and right-sided hearing loss with tinnitus lasting about 1 month earlier this fall, but that his symptoms have \"subsided\" somewhat in the past couple of weeks. He states he has a baseline " "level of \"ringing\" tinnitus in the right ear that becomes exacerbated with episodes of dizziness and drops in hearing. David denies ear infections, otalgia or otorrhea. He denies hearing aid use or prior otologic surgery. He is referred by Bianca Virk PA-C / IRENE Wan.    OBJECTIVE:  Otoscopic Examination: Otoscopic examination was performed and revealed clear ear canals bilaterally.  Pure Tone Thresholds:  RIGHT: Mild to moderately severe sensorineural hearing loss in slightly peaked/mostly sloping configuration (borderline normal threshold at 500 Hz; 15-dB air-bone gap at 4 kHz only)  LEFT: Normal hearing thresholds with exception of a mild to slight sensorineural hearing loss notch from 4-6 kHz (15-dB air-bone gap at 4 kHz only)  Tympanometry:  Right tympanogram: Type A with normal ear canal volume  Left tympanogram: Type A with normal ear canal volume  Speech Reception Threshold:  RIGHT: 35 dB HL  LEFT: 25 dB HL  Word Recognition Score:  RIGHT: 84% at 75 dB HL with NU-6 word list.  LEFT: 100% at 65 dB HL with NU-6 word list.    ASSESSMENT: Results of pure tone audiometry were as described above, indicating asymmetric sensorineural hearing loss, right ear worse than left, but with right ear showing significant improvement in the low to mid frequencies compared to February testing at outside facility. Tympanometry was consistent with intact and appropriately mobile tympanic membranes with normal middle ear pressure bilaterally. SRTs were consistent with the pure tone testing, indicating good test reliability. Word recognition was excellent left and good right.    PLAN: Results were discussed with David. He was counseled about hearing loss and its impact on communication. He is a candidate for a hearing aid in the right ear only, and we discussed the underlying connections between hearing loss and tinnitus today. We also discussed potential benefits from a hearing aid in the right ear " including not only hearing improvement but also possible improvement (reduction) in tinnitus severity and/or awareness. David is ramos that not all patients find this benefit.     Review of Systems:   Pertinent items are noted in HPI.        No data to display                 Active Medications:     Current Outpatient Medications:     ASPIRIN 325 MG PO TABS, 2 TABLETS EVERY 4 TO 6 HOURS AS NEEDED, Disp: , Rfl:     Atorvastatin Calcium (LIPITOR PO), , Disp: , Rfl:     Cholecalciferol (VITAMIN D) 2000 UNIT CAPS, Take 2,000 mg by mouth daily., Disp: , Rfl:     HYDROCHLOROTHIAZIDE 50 MG OR TABS, 1 TABLET DAILY, Disp: , Rfl:     hydrOXYzine (VISTARIL) 25 MG capsule, Take 1 pill every 6 hours as needed for pain., Disp: 50 capsule, Rfl: 0    LORazepam (ATIVAN) 1 MG tablet, Take 1 tablet (1 mg) by mouth 2 times daily as needed (dizziness, anxiety, nausea), Disp: 15 tablet, Rfl: 0    losartan (COZAAR) 25 MG tablet, Take 0.5 tablets by mouth daily, Disp: , Rfl:     lovastatin (MEVACOR) 40 MG tablet, Take 40 mg by mouth daily, Disp: , Rfl:     meclizine (ANTIVERT) 12.5 MG tablet, Take 1 tablet (12.5 mg) by mouth 4 times daily as needed for dizziness, Disp: 30 tablet, Rfl: 0    metFORMIN (GLUCOPHAGE XR) 500 MG 24 hr tablet, Take 500 mg by mouth, Disp: , Rfl:     moxifloxacin (VIGAMOX) 0.5 % ophthalmic solution, Place 1 drop into the right eye 4 times daily, Disp: 3 mL, Rfl: 0    Omega-3 Fatty Acids (FISH OIL) 1000 MG CPDR, Take 2,000 mg by mouth daily., Disp: , Rfl:     ondansetron (ZOFRAN ODT) 4 MG ODT tab, Take 1-2 tablets (4-8 mg) by mouth every 8 hours as needed for nausea, Disp: 12 tablet, Rfl: 0    oxyCODONE (ROXICODONE) 5 MG immediate release tablet, Take 1 tablet (5 mg) by mouth every 4 hours as needed for moderate to severe pain, Disp: 60 tablet, Rfl: 0    temazepam (RESTORIL) 15 MG capsule, Take 15 mg by mouth, Disp: , Rfl:     traZODone (DESYREL) 50 MG tablet, Take 1 tablet by mouth At Bedtime, Disp: , Rfl:      Current Facility-Administered Medications:     carboxymethylcellulose PF (REFRESH PLUS) 0.5 % ophthalmic solution 1 drop, 1 drop, Both Eyes, 4x Daily PRN, Nanette Tellez MD      Allergies:   Lisinopril      Past Medical History:  Past Medical History:   Diagnosis Date    Hypertension      Patient Active Problem List   Diagnosis    Contusion shoulder/arm    Epicondylitis, lateral humeral    Lateral epicondylitis    Pain in joint, upper arm    Synovitis and tenosynovitis    Foreign body of right ear    Tinnitus    Impingement syndrome, shoulder    Paresthesias/numbness    MRSA infection    CARDIOVASCULAR SCREENING; LDL GOAL LESS THAN 130    Arthrosis of right acromioclavicular joint    Biceps tendonitis    Rotator cuff tendinitis    Hypertension goal BP (blood pressure) < 140/90        Past Surgical History:  Past Surgical History:   Procedure Laterality Date    ARTHROSCOPY SHOULDER, OPEN DISTAL CLAVICLE REPAIR, COMBINED Right 2014    Procedure: COMBINED ARTHROSCOPY SHOULDER, OPEN DISTAL CLAVICLE RESECTION;  Surgeon: Cristóbal Rose MD;  Location: [facility] OR    ARTHROSCOPY SHOULDER, OPEN ROTATOR CUFF REPAIR, COMBINED Right 2014    Procedure: COMBINED ARTHROSCOPY SHOULDER, OPEN ROTATOR CUFF REPAIR;  Surgeon: Cristóbal Rose MD;  Location: [facility] OR    NO HISTORY OF SURGERY         Family History:   Family History   Problem Relation Age of Onset    Arthritis Mother     Arthritis Father     Diabetes No family hx of     Glaucoma No family hx of     Macular Degeneration No family hx of          Social History:   Social History     Tobacco Use    Smoking status: Former     Current packs/day: 0.00     Types: Cigarettes     Quit date: 1989     Years since quittin.1    Smokeless tobacco: Never   Substance Use Topics    Alcohol use: Yes     Comment: 8-10 beers weekly    Drug use: No        Physical Exam:   No vitals were obtained today due to virtual visit.  PSYCH: Alert and oriented times 3;  coherent speech, normal   rate and volume, able to articulate logical thoughts, able   to abstract reason, no tangential thoughts, no hallucinations   or delusions   RESP: No cough, no audible wheezing, able to talk in full sentences  Remainder of exam unable to be completed due to telephone visits    Audiogram:  AUDIOGRAM: He underwent an audiogram today (05/08/2024). This demonstrated:  Results: Left: Normal sloping to mild SNHL rising to normal hearing. Right: Moderate sloping to severe SNHL. Tymps WNL bilaterally.  Reflexes: Ipsi reflexes present at normal levels, contra reflexes elevated. Asymmetrical word rec noted.        Right: Speech reception threshold is 55 dB with 60% word recognition. Tympanogram A type   Left: Speech reception threshold is 20 dB with 100% word recognition. Tympanogram A type     12/12/2023 AUDIOGRAM [MISSING 11/27/23 audiogram]  HISTORY: vertigo, tinnitus, hearing loss  OTOSCOPY: clear EAC, each ear; both TMs were visualized  TYMPANOMETRY: Type A; each ear  AUDIOGRAM: RIGHT: essentially moderately-severe (205-3000 Hz) sloping to mild/slight (4000-6000Hz) rising to normal (8000Hz) sensorineural hearing loss.   RE 10/11/19: left war has decreased 5-10 dB across 250-4000Hz; right ear has decreased 35-50 dB across 250-1000 Hz and 5-15 dB across  2-8 kHz.       Audiogram was independently reviewed    Imaging:  MRA NECK WITHOUT AND WITH CONTRAST  (10/10/2023)   HISTORY: 3 days of constant vertigo      COMPARISON: None.      FINDINGS:    Normal origin of the great vessels from the aortic arch.      Right carotid artery: The right common and internal carotid arteries are patent. No significant stenosis.      Left carotid artery: The left common and internal carotid arteries are patent. No significant stenosis.      Vertebral arteries: Vertebral arteries appear patent without evidence of dissection. No significant stenosis.      IMPRESSION:  No stenosis/occlusion or dissection.     MRI BRAIN  WITHOUT AND WITH CONTRAST (10/10/2023)  HISTORY: 3 days of constant vertigo.    COMPARISON: None.    FINDINGS: Questionable punctate focus of diffusion restriction in the Impression    IMPRESSION:  Questionable punctate focus of diffusion restriction in the left parietal periventricular white matter, which is favored to be artifactual, although a tiny acute infarct is possible.  Background age-related chronic microvascular ischemia.    MR ANGIOGRAM OF THE HEAD WITHOUT CONTRAST (10/10/2023)  HISTORY: 3 days of constant vertigo    COMPARISON: None.    FINDINGS: The major intracranial arteries including the proximal impression.    IMPRESSION:  Normal MRA of the [place] without large vessel occlusion.    Outside records from [facility] were independently reviewed.       Assessment and Plan:  David Wu is a 69 year old male who presents for consultation regarding vertigo secondary to right Meniere's disease.     His hearing exam demonstrates normal to mild sensorineural hearing loss on the left side and moderately to severe to severe sensorineural hearing loss on the right side; with asymmetrical word recognition.   I reviewed his vestibular testing results, and he has a right caloric weakness of 37% and ECOG supports hydrops.  He expressed concern regarding tinnitus, and he is very interested in addressing this.He estimates that his dizzy spells are occurring 6-weeks apart.    He came today interested in ablation. We discussed the range of options from sodium restriction, diuretic, IT steroids, IT gentamicin, and labyrinthectomy with or without cochlear implantation.  It was made clear to the patient that ablation would not remove the tinnitus. If he were to have a labyrinthectomy, a cochlear implant could be placed and tinnitus may be improved. He recounted his cousin having a cochlear implant that became infected and had to be removed, which he is concerned about. Surgical  options for treating Meniere's disease have been discussed. He has been advised to remain very hydrated and avoid alcohol, caffiene, and sodium.      I recommend that we start with a nonablative option.  He will present for an IT dexamethasone injection and monitor his symptoms. He has decided to proceed with this plan with his next follow-up.     Follow-up: Please return to clinic for an injection.     Scribe Disclosure:   I, Caren Rosario, am serving as a scribe; to document services personally performed by Kelsi Middleton MD -based on data collection and the provider's statements to me.     Provider Disclosure:  I agree with above History, Review of Systems, Physical exam and Plan.  I have reviewed the content of the documentation and have edited it as needed. I have personally performed the services documented here and the documentation accurately represents those services and the decisions I have made.      Electronically signed by:  Kelsi Middleton MD  Otology & Neurotology  [facility]    Phone call duration: 32 minutes   Start Time: 11:41 CST  End Time:  12:14 CST

## 2024-05-10 NOTE — PATIENT INSTRUCTIONS
You were seen in the ENT Clinic today by Dr. Middleton. If you have any questions or concerns after your appointment, please contact us (see below)      2.   Please return to clinic for an injection.         How to Contact Us:  Send a Cortrium message to your provider. Our team will respond to you via Cortrium. Occasionally, we will need to call you to get further information.  For urgent matters (Monday-Friday), call the ENT Clinic: [phone] and speak with a call center team member - they will route your call appropriately.   If you'd like to speak directly with a nurse, please find our contact information below. We do our best to check voicemail frequently throughout the day, and will work to call you back within 1-2 days. For urgent matters, please use the general clinic phone numbers listed above.      Divine MEDINA RN  ENT RN Care Coordinator  Direct: [phone]    Vanesa JOE LPN  Direct: [phone]

## 2024-05-10 NOTE — PROGRESS NOTES
".David is a 69 year old who is being evaluated via a billable telephone visit.    What phone number would you like to be contacted at? [phone]  How would you like to obtain your AVS? Mail a copy  Originating Location (pt. Location): {patient location:021616::\"Home\"}  {PROVIDER LOCATION On-site should be selected for visits conducted from your clinic location or adjoining [facility] hospital, academic office, or other nearby [facility] building. Off-site should be selected for all other provider locations, including home:383415}  Distant Location (provider location):  {virtual location provider:593425}    {PROVIDER CHARTING PREFERENCE:882371}    Delbert Hernandez is a 69 year old, presenting for the following health issues:  No chief complaint on file.    HPI     ***    {ROS Picklists (Optional):810088}      Objective           Vitals:  No vitals were obtained today due to virtual visit.    Physical Exam   General: Alert and no distress //Respiratory: No audible wheeze, cough, or shortness of breath // Psychiatric:  Appropriate affect, tone, and pace of words      {Diagnostic Test Results (Optional):531319}      Phone call duration: *** minutes  Signed Electronically by: Kelsi Middleton MD  {Email feedback regarding this note to [email or web address]   :661529}   "

## 2024-05-16 ENCOUNTER — TELEPHONE (OUTPATIENT)
Dept: OTOLARYNGOLOGY | Facility: CLINIC | Age: 69
End: 2024-05-16
Payer: COMMERCIAL

## 2024-05-16 NOTE — TELEPHONE ENCOUNTER
Patient confirmed scheduled appointment:  Date: 5/24  Time: 820  Provider: Kane  Location: [facility]   Testing/imaging:   Additional notes: .

## 2024-05-21 NOTE — PROGRESS NOTES
Neurotology Clinic      Name: David Wu  MRN: [id number]  Age: 69 year old  : 1955  2024      Chief Complaint:   Follow up     History of Present Illness:   David Wu is a 69 year old male with a history of Meniere's disease (right), and asymmetrical sensorineural hearing loss and vertigo who presents for follow up for a intratympanic dexamethasone injection.    His vertigo symptoms started over 25 years ago, with associated dizziness and imbalance. He notes pressure and volume changes a day or so before an episode, with constant tinnitus in the right ear. He says that the episodes are progressively getting worse, and lasting for 5-6 hours. His hearing is worse on the right side, with distortion.  He has had some therapy where he used to live, went about three times, and then discontinued because he felt worse afterwards. His previous visit was a virtual visit on 05/10/2024, where it was decided to start with a nonablative option, with IT dexamethasone injections and monitoring his symptoms. He has been advised to remain very hydrated and avoid alcohol, caffiene, and sodium.      Today, he says that he has concerns regarding his hearing being a lot worse than the test results. He is not sure what to do after the shot for his hearing. If he covers his left ear, he cannot hear out of his right side. He continues to have tinnitus. He describes frustration having conversations while driving or in similar noisy environments. He was able to white off the vertigo yesterday. He reiterates that the worse ones were back in November and 2023. He says that his hearing aid has not been working for him. He has granddaughters, who can squeal at frequencies that he finds bothersome. He says that he has experience getting steroid injections for his back and hip. He said that he has had a hot slag in his right ear canal before. He maintains that he does not want to lose all the function in his  ear.     Review of Systems:   Pertinent items are noted in HPI or as in patient entered ROS below, remainder of complete ROS is negative.       5/24/2024     8:18 AM    ENT ROS   Constitutional Unexplained fever or night sweats   Neurology Dizzy spells    Headache   Ears, Nose, Throat Hearing loss    Ear pain    Ringing/noise in ears   Musculoskeletal Sore or stiff joints    Back pain    Neck pain   Endocrine Frequent urination         Physical Exam:   BP (!) 144/77   Pulse 72   Temp 98.5  F (36.9  C)   Ht 1.829 m (6')   Wt 99.3 kg (219 lb)   SpO2 97%   BMI 29.70 kg/m       Constitutional:  The patient was unaccompanied, well-groomed, and in no acute distress.     Skin: Normal:  warm and pink without rash   Neurologic: Alert and oriented x 3.  CN's III-XII within normal limits.  Voice normal.    Psychiatric: The patient's affect was calm, cooperative, and appropriate.     Communication:  Normal; communicates verbally, normal voice quality.   Respiratory: Breathing comfortably without stridor or exertion of accessory muscles.    Head/Face:  No lesions or scars. No sinus tenderness.     Eyes: Pupils were equal and reactive.  Extraocular movement intact.     Ears: Pinnae and tragus non-tender.        Otologic microscope exam:  Right ear: There is cerumen impacted against the roof of the ear canal, removed with a curette.     Right intra-tympanic steroid injection:  Procedure:  Informed consent was obtained and signed.  Time Out was conducted with confirmation of the patient's name, side of the procedure and procedure to be done.      Using the otologic microscope the patient's ear canal and tympanic membrane were examined.  Phenol was applied to the posterior inferior aspect of the tympanic membrane for anesthesia.  Once that had taken effect, I placed the needle through the tympanic membrane and filled the middle ear space with Dexamethasone 24 mg/mL, total volume 0.5 mL.  Patient rests with that ear upwards  for 20 - 25 minutes and refrained from swallowing during that time period.     Assessment and Plan:  David Wu is a 69 year old male with a history of Right Meniere's Disease who presents for intratympanic dexamethasone injection for the right ear.     I reviewed the full range of treatment options for Meniere's disease today. In regards to concerns for his hearing and its relation to the injection, it was explained that the injection is for treating his vertigo, and not aimed at hearing improvement based on current knowledge.  It has been explained to the patient that this injection is not a permanent fix, but rather a treatment plan with an outcome that depends on individual cases; with roughly 2 out of every 3 people reporting an improvement in vertigo with treatment over time. He decided to proceed with the injection.  I reviewed the steps of the procedure and the common and serious risks including tympanic membrane perforation, pain, infection, and failure to achieve the desired result.    He has expressed interest in getting a hearing device. With 60% WRS, he may benefit from a traditional hearing aid vs. CROS.  I offered place a referral for this today.      He asked about CI. I do not typically recommend CI if the patient's MD is uncontrolled, and it is recommended to address the vertigo before pursing a cochlear implant. He does not want an ablative strategy, however, at this time. He needs to trial a hearing aid first.    Following the injection today, he has been advised to not go swimming this weekend, and to follow dry ear precautions. We will monitor and wait to see if there are any significant changes at the next appointment.  Follow up in 3 to 6 months with audiogram depending on symptom control (sooner if recurrence).     Scribe Disclosure:   AMADO, Caren Rosario, am serving as a scribe; to document services personally performed by Kelsi Middleton MD -based on data collection and the  provider's statements to me.     Provider Disclosure:  I agree with above History, Review of Systems, Physical exam and Plan.  I have reviewed the content of the documentation and have edited it as needed. I have personally performed the services documented here and the documentation accurately represents those services and the decisions I have made.      Electronically signed by:  Kelsi Middleton MD  Otology & Neurotology  [facility]

## 2024-05-24 ENCOUNTER — OFFICE VISIT (OUTPATIENT)
Dept: OTOLARYNGOLOGY | Facility: CLINIC | Age: 69
End: 2024-05-24
Payer: COMMERCIAL

## 2024-05-24 VITALS
TEMPERATURE: 98.5 F | HEART RATE: 72 BPM | SYSTOLIC BLOOD PRESSURE: 144 MMHG | OXYGEN SATURATION: 97 % | WEIGHT: 219 LBS | HEIGHT: 72 IN | BODY MASS INDEX: 29.66 KG/M2 | DIASTOLIC BLOOD PRESSURE: 77 MMHG

## 2024-05-24 DIAGNOSIS — H90.3 ASYMMETRICAL SENSORINEURAL HEARING LOSS: ICD-10-CM

## 2024-05-24 DIAGNOSIS — R42 DIZZINESS: ICD-10-CM

## 2024-05-24 DIAGNOSIS — H93.11 TINNITUS, RIGHT: ICD-10-CM

## 2024-05-24 DIAGNOSIS — H81.01 MENIERE'S DISEASE, RIGHT: Primary | ICD-10-CM

## 2024-05-24 PROCEDURE — 99203 OFFICE O/P NEW LOW 30 MIN: CPT | Mod: 25 | Performed by: OTOLARYNGOLOGY

## 2024-05-24 PROCEDURE — 69801 INCISE INNER EAR: CPT | Mod: RT | Performed by: OTOLARYNGOLOGY

## 2024-05-24 ASSESSMENT — PAIN SCALES - GENERAL: PAINLEVEL: SEVERE PAIN (6)

## 2024-05-24 NOTE — LETTER
2024       RE: David Wu  [address] [place] [address]     Dear Colleague,    Thank you for referring your patient, David Wu, to the [facility] [place] at [facility]. Please see a copy of my visit note below.      Neurotology Clinic      Name: David Wu  MRN: [id number]  Age: 69 year old  : 1955  2024      Chief Complaint:   Follow up     History of Present Illness:   David Wu is a 69 year old male with a history of Meniere's disease (right), and asymmetrical sensorineural hearing loss and vertigo who presents for follow up for a intratympanic dexamethasone injection.    His vertigo symptoms started over 25 years ago, with associated dizziness and imbalance. He notes pressure and volume changes a day or so before an episode, with constant tinnitus in the right ear. He says that the episodes are progressively getting worse, and lasting for 5-6 hours. His hearing is worse on the right side, with distortion.  He has had some therapy where he used to live, went about three times, and then discontinued because he felt worse afterwards. His previous visit was a virtual visit on 05/10/2024, where it was decided to start with a nonablative option, with IT dexamethasone injections and monitoring his symptoms. He has been advised to remain very hydrated and avoid alcohol, caffiene, and sodium.      Today, he says that he has concerns regarding his hearing being a lot worse than the test results. He is not sure what to do after the shot for his hearing. If he covers his left ear, he cannot hear out of his right side. He continues to have tinnitus. He describes frustration having conversations while driving or in similar noisy environments. He was able to white off the vertigo yesterday. He reiterates that the worse ones were back in November and 2023. He says that his hearing aid  has not been working for him. He has granddaughters, who can squeal at frequencies that he finds bothersome. He says that he has experience getting steroid injections for his back and hip. He said that he has had a hot slag in his right ear canal before. He maintains that he does not want to lose all the function in his ear.     Review of Systems:   Pertinent items are noted in HPI or as in patient entered ROS below, remainder of complete ROS is negative.       5/24/2024     8:18 AM    ENT ROS   Constitutional Unexplained fever or night sweats   Neurology Dizzy spells    Headache   Ears, Nose, Throat Hearing loss    Ear pain    Ringing/noise in ears   Musculoskeletal Sore or stiff joints    Back pain    Neck pain   Endocrine Frequent urination         Physical Exam:   BP (!) 144/77   Pulse 72   Temp 98.5  F (36.9  C)   Ht 1.829 m (6')   Wt 99.3 kg (219 lb)   SpO2 97%   BMI 29.70 kg/m       Constitutional:  The patient was unaccompanied, well-groomed, and in no acute distress.     Skin: Normal:  warm and pink without rash   Neurologic: Alert and oriented x 3.  CN's III-XII within normal limits.  Voice normal.    Psychiatric: The patient's affect was calm, cooperative, and appropriate.     Communication:  Normal; communicates verbally, normal voice quality.   Respiratory: Breathing comfortably without stridor or exertion of accessory muscles.    Head/Face:  No lesions or scars. No sinus tenderness.     Eyes: Pupils were equal and reactive.  Extraocular movement intact.     Ears: Pinnae and tragus non-tender.        Otologic microscope exam:  Right ear: There is cerumen impacted against the roof of the ear canal, removed with a curette.     Right intra-tympanic steroid injection:  Procedure:  Informed consent was obtained and signed.  Time Out was conducted with confirmation of the patient's name, side of the procedure and procedure to be done.      Using the otologic microscope the patient's ear canal and  tympanic membrane were examined.  Phenol was applied to the posterior inferior aspect of the tympanic membrane for anesthesia.  Once that had taken effect, I placed the needle through the tympanic membrane and filled the middle ear space with Dexamethasone 24 mg/mL, total volume 0.5 mL.  Patient rests with that ear upwards for 20 - 25 minutes and refrained from swallowing during that time period.     Assessment and Plan:  David Wu is a 69 year old male with a history of Right Meniere's Disease who presents for intratympanic dexamethasone injection for the right ear.     I reviewed the full range of treatment options for Meniere's disease today. In regards to concerns for his hearing and its relation to the injection, it was explained that the injection is for treating his vertigo, and not aimed at hearing improvement based on current knowledge.  It has been explained to the patient that this injection is not a permanent fix, but rather a treatment plan with an outcome that depends on individual cases; with roughly 2 out of every 3 people reporting an improvement in vertigo with treatment over time. He decided to proceed with the injection.  I reviewed the steps of the procedure and the common and serious risks including tympanic membrane perforation, pain, infection, and failure to achieve the desired result.    He has expressed interest in getting a hearing device. With 60% WRS, he may benefit from a traditional hearing aid vs. CROS.  I offered place a referral for this today.      He asked about CI. I do not typically recommend CI if the patient's MD is uncontrolled, and it is recommended to address the vertigo before pursing a cochlear implant. He does not want an ablative strategy, however, at this time. He needs to trial a hearing aid first.    Following the injection today, he has been advised to not go swimming this weekend, and to follow dry ear precautions. We will monitor and wait to see if there  are any significant changes at the next appointment.  Follow up in 3 to 6 months with audiogram depending on symptom control (sooner if recurrence).     Scribe Disclosure:   I, Caren Rosario, am serving as a scribe; to document services personally performed by Kelsi Middleton MD -based on data collection and the provider's statements to me.     Provider Disclosure:  I agree with above History, Review of Systems, Physical exam and Plan.  I have reviewed the content of the documentation and have edited it as needed. I have personally performed the services documented here and the documentation accurately represents those services and the decisions I have made.      Electronically signed by:  Kelsi Middleton MD  Otology & Neurotology  [facility]      Again, thank you for allowing me to participate in the care of your patient.      Sincerely,    Kelsi Middleton MD

## 2024-05-24 NOTE — PATIENT INSTRUCTIONS
You were seen in the ENT Clinic today by Dr. Middleton. If you have any questions or concerns after your appointment, please contact us (see below)    The following has been recommended for you based upon your appointment today:      Please return to clinic     How to Contact Us:  Send a Modacruz message to your provider. Our team will respond to you via Modacruz. Occasionally, we will need to call you to get further information.  For urgent matters (Monday-Friday), call the ENT Clinic: [phone] and speak with a call center team member - they will route your call appropriately.   If you'd like to speak directly with a nurse, please find our contact information below. We do our best to check voicemail frequently throughout the day, and will work to call you back within 1-2 days. For urgent matters, please use the general clinic phone numbers listed above.    Divine MEDINA, RN, BSN  RN Care Coordinator, ENT Clinic  [facility]  Direct: [phone]  Vanesa SHAHID LPN  Direct: [phone]

## 2024-05-24 NOTE — NURSING NOTE
Chief Complaint   Patient presents with    RECHECK     Follow up     Blood pressure (!) 144/77, pulse 72, temperature 98.5  F (36.9  C), height 1.829 m (6'), weight 99.3 kg (219 lb), SpO2 97%.  Phong Garcia LPN

## 2024-05-24 NOTE — LETTER
Hearing Aid Medical Clearance    David Wu  May 24, 2024        This patient has received a medical examination and may be considered a suitable candidate for a hearing aid.         Physician:__________________________________________________              Kelsi Middleton MD